# Patient Record
Sex: FEMALE | Race: WHITE | Employment: FULL TIME | URBAN - METROPOLITAN AREA
[De-identification: names, ages, dates, MRNs, and addresses within clinical notes are randomized per-mention and may not be internally consistent; named-entity substitution may affect disease eponyms.]

---

## 2017-07-26 ENCOUNTER — OFFICE VISIT (OUTPATIENT)
Dept: INTERNAL MEDICINE CLINIC | Age: 27
End: 2017-07-26

## 2017-07-26 VITALS
TEMPERATURE: 99.2 F | SYSTOLIC BLOOD PRESSURE: 106 MMHG | OXYGEN SATURATION: 95 % | DIASTOLIC BLOOD PRESSURE: 82 MMHG | HEART RATE: 97 BPM | BODY MASS INDEX: 26.18 KG/M2 | WEIGHT: 189 LBS

## 2017-07-26 DIAGNOSIS — J02.9 PHARYNGITIS, UNSPECIFIED ETIOLOGY: Primary | ICD-10-CM

## 2017-07-26 LAB — S PYO AG THROAT QL: NORMAL

## 2017-07-26 PROCEDURE — 87880 STREP A ASSAY W/OPTIC: CPT | Performed by: NURSE PRACTITIONER

## 2017-07-26 PROCEDURE — 99213 OFFICE O/P EST LOW 20 MIN: CPT | Performed by: NURSE PRACTITIONER

## 2017-07-26 RX ORDER — AMOXICILLIN AND CLAVULANATE POTASSIUM 875; 125 MG/1; MG/1
1 TABLET, FILM COATED ORAL EVERY 12 HOURS
Qty: 20 TABLET | Refills: 0 | Status: SHIPPED | OUTPATIENT
Start: 2017-07-26 | End: 2018-11-27 | Stop reason: SDUPTHER

## 2017-07-28 LAB — THROAT CULTURE: NORMAL

## 2017-08-04 ENCOUNTER — TELEPHONE (OUTPATIENT)
Dept: INTERNAL MEDICINE CLINIC | Age: 27
End: 2017-08-04

## 2017-08-04 RX ORDER — FLUCONAZOLE 150 MG/1
150 TABLET ORAL ONCE
Qty: 2 TABLET | Refills: 0 | Status: SHIPPED | OUTPATIENT
Start: 2017-08-04 | End: 2017-08-04

## 2018-01-12 ENCOUNTER — OFFICE VISIT (OUTPATIENT)
Dept: GYNECOLOGY | Age: 28
End: 2018-01-12

## 2018-01-12 VITALS
WEIGHT: 194 LBS | DIASTOLIC BLOOD PRESSURE: 62 MMHG | SYSTOLIC BLOOD PRESSURE: 113 MMHG | HEART RATE: 58 BPM | BODY MASS INDEX: 26.87 KG/M2

## 2018-01-12 DIAGNOSIS — Z76.89 ENCOUNTER TO ESTABLISH CARE: ICD-10-CM

## 2018-01-12 DIAGNOSIS — Z12.4 SCREENING FOR CERVICAL CANCER: ICD-10-CM

## 2018-01-12 DIAGNOSIS — N89.8 VAGINAL DISCHARGE: ICD-10-CM

## 2018-01-12 DIAGNOSIS — Z01.419 WOMEN'S ANNUAL ROUTINE GYNECOLOGICAL EXAMINATION: Primary | ICD-10-CM

## 2018-01-12 DIAGNOSIS — Z11.3 SCREENING FOR STDS (SEXUALLY TRANSMITTED DISEASES): ICD-10-CM

## 2018-01-12 LAB
BACTERIA WET PREP: NORMAL
CLUE CELLS: NORMAL
EPITHELIAL CELLS WET PREP: NORMAL
RBC WET PREP: NORMAL
SOURCE WET PREP: NORMAL
TRICHOMONAS PREP: NORMAL
WBC WET PREP: NORMAL
YEAST WET PREP: NORMAL

## 2018-01-12 PROCEDURE — 99385 PREV VISIT NEW AGE 18-39: CPT | Performed by: NURSE PRACTITIONER

## 2018-01-12 NOTE — PROGRESS NOTES
[facility]-Gynecology  Annual GYN Visit  Llewellyn Galeazzi, CNP    Zina Katz  YOB: 1990    Date of Service:  1/12/2018    Vitals:    01/12/18 0903   BP: 113/62   Pulse: 58   Weight: 194 lb (88 kg)     Body mass index is 26.87 kg/m². Wt Readings from Last 3 Encounters:   01/12/18 194 lb (88 kg)   12/20/17 190 lb (86.2 kg)   07/26/17 189 lb (85.7 kg)     BP Readings from Last 3 Encounters:   01/12/18 113/62   12/20/17 114/70   07/26/17 106/82       Chief Complaint:   Zina Katz is a 33 y.o. female who presents as a new patient for routine annual gynecologic visit and complaints of vaginal discharge and odor. HPI:  Patient complains of vaginal discharge- yellow with odor for 1 month. She denies irregular menses, heavy menses, dysmenorrhea, dyspareunia, pelvic pain, vulvar/vaginal itching, vulvar/vaginal irritation/pain, urinary frequency, urinary urgency and urinary incontinence. OCP/HRT side effects: not applicable. Patient is currently sexually active; one partner; expresses some concerns for STDs. Patient's last menstrual period was 12/19/2017 (exact date). Allergies   Allergen Reactions    Bactrim [Sulfamethoxazole-Trimethoprim] Hives     No outpatient prescriptions have been marked as taking for the 1/12/18 encounter (Office Visit) with Stephanie Pichardo CNP. Review of Systems:  A comprehensive review of systems was negative except for what was noted in the HPI. Physical Exam:  Constitutional: She is oriented to person, place, and time. She appears well-developed and well-nourished. No distress. Neck: No mass and no thyromegaly present. Cardiovascular: Normal rate, regular rhythm and normal heart sounds. No murmur heard. Pulmonary/Chest: Effort and breath sounds normal.   Abdominal: Soft, non-tender. No distension or masses.     Genitourinary: normal external genitalia, vulva, vagina, cervix, uterus and adnexa, urethral meatus normal and bladder not palpable and Cervix:  Inflammation; No CMT. Breast exam:  breasts appear normal, no suspicious masses, no skin or nipple changes or axillary nodes. Neurological: She is alert and oriented to person, place, and time. Skin: Skin is warm and dry. No rash noted. No erythema. Psychiatric: She has a normal mood and affect. Her behavior is normal.     Preventive Care and Risk Factor Assessment:  Hx abnormal PAP: n/a; no previous PAP  Self-breast exams: no  Exercise: cardiovascular equipment 20 minutes/day- 4 days/week and strength training 45 minutes/day- 4 days/week  Social History   Substance Use Topics    Smoking status: Never Smoker    Smokeless tobacco: Never Used    Alcohol use 3.6 oz/week     6 Cans of beer per week      History   Sexual Activity    Sexual activity: No     Hx of STD: no     Health Maintenance   Topic Date Due    HIV screen  12/23/2006    DTaP/Tdap/Td vaccine (1 - Tdap) 12/23/2010    Cervical cancer screen  12/23/2012    Flu vaccine (1) 09/01/2017       Assessment/Plan:  1. Encounter to establish care  2. Women's annual routine gynecological examination  [place] was seen today as a new patient for an annual gynecological exam.    - Healthy diet  - Exercise  - Self breast exams    3. Screening for cervical cancer  Continue annual screenings    - PAP SMEAR    4. Vaginal discharge  Scant white, thin, non-adherent, nonmalodorous discharge noted on exam. Possible BV. Cultures pending; treatment deferred until resulted. - Wet prep, genital  - Culture, Genital    5. Screening for STDs (sexually transmitted diseases)  Patient desires STD screening. Declines screening for HIV, Hepatitis, and Syphilis. Advised to complete these screenings if DNA probe or trichomonas is positive.    - C.trachomatis N.gonorrhoeae DNA, Thin Prep       Return in about 1 year (around 1/12/2019), or if symptoms worsen or fail to improve.

## 2018-01-12 NOTE — PATIENT INSTRUCTIONS
Patient Education        Well Visit, Ages 25 to 48: Care Instructions  Your Care Instructions    Physical exams can help you stay healthy. Your doctor has checked your overall health and may have suggested ways to take good care of yourself. He or she also may have recommended tests. At home, you can help prevent illness with healthy eating, regular exercise, and other steps. Follow-up care is a key part of your treatment and safety. Be sure to make and go to all appointments, and call your doctor if you are having problems. It's also a good idea to know your test results and keep a list of the medicines you take. How can you care for yourself at home? · Reach and stay at a healthy weight. This will lower your risk for many problems, such as obesity, diabetes, heart disease, and high blood pressure. · Get at least 30 minutes of physical activity on most days of the week. Walking is a good choice. You also may want to do other activities, such as running, swimming, cycling, or playing tennis or team sports. Discuss any changes in your exercise program with your doctor. · Do not smoke or allow others to smoke around you. If you need help quitting, talk to your doctor about stop-smoking programs and medicines. These can increase your chances of quitting for good. · Talk to your doctor about whether you have any risk factors for sexually transmitted infections (STIs). Having one sex partner (who does not have STIs and does not have sex with anyone else) is a good way to avoid these infections. · Use birth control if you do not want to have children at this time. Talk with your doctor about the choices available and what might be best for you. · Protect your skin from too much sun. When you're outdoors from 10 a.m. to 4 p.m., stay in the shade or cover up with clothing and a hat with a wide brim. Wear sunglasses that block UV rays.  Even when it's cloudy, put broad-spectrum sunscreen (SPF 30 or higher) on any condoms. For men  · Tests for sexually transmitted infections (STIs). Ask whether you should have tests for STIs. You may be at risk if you have sex with more than one person, especially if you do not wear a condom. · Testicular cancer exam. Ask your doctor whether you should check your testicles regularly. · Prostate exam. Talk to your doctor about whether you should have a blood test (called a PSA test) for prostate cancer. Experts differ on whether and when men should have this test. Some experts suggest it if you are older than 39 and are -American or have a father or brother who got prostate cancer when he was younger than 72. When should you call for help? Watch closely for changes in your health, and be sure to contact your doctor if you have any problems or symptoms that concern you. Where can you learn more? Go to https://Syrenaicapesalvatoreeb.healthWorldrat. org and sign in to your [employer] account. Enter P072 in the Vettro box to learn more about \"Well Visit, Ages 25 to 48: Care Instructions. \"     If you do not have an account, please click on the \"Sign Up Now\" link. Current as of: May 12, 2017  Content Version: 11.5  © 8972-3348 Jinni. Care instructions adapted under license by [employer]. If you have questions about a medical condition or this instruction, always ask your healthcare professional. [address] any warranty or liability for your use of this information. Patient Education        Learning About Pap Tests  What is a Pap test?    The Pap test (also called a Pap smear) is a screening test for cancer of the cervix, which is the lower part of the uterus that opens into the vagina. The test can help your doctor find early changes in the cells that could lead to cancer. During the test, the doctor or nurse will insert a tool called a speculum into your vagina. The speculum gently spreads apart the vaginal walls.  That allows your doctor to see inside the vagina and the cervix. He or she uses a cotton swab or brush to collect cell samples from your cervix. Try to schedule the test when you're not having your period. To get ready for a Pap test, avoid douches, tampons, vaginal medicines, sprays, or powders for at least a day before you have the test.  When should you have a Pap test?  Women should start having Pap tests at age 24. If you are younger than 24 and are sexually active, it's still a good idea to have regular testing for sexually transmitted infections. · Women 21 to 30 should have Pap tests every 3 years. · Women 30 to 72 may continue to have a Pap test every 3 years as long as their results are normal. Or they can choose to have a Pap test and an HPV test. This is called co-testing. With co-testing, women can have screening every 5 years as long as their test results are normal.  · Women 72 and older may no longer need Pap tests. When to stop having Pap tests depends on your medical history, your overall health, and your risk of cervical cell changes or cervical cancer. Talk with your doctor about whether you should stop or continue to have Pap tests. He or she can help you decide. These recommendations do not apply to women who have had a serious abnormal Pap test result or who have certain health problems. Talk to your doctor about how often you should be tested. There is also a newer test called a primary HPV test. It is used in women ages 22 and older. And it is done every 3 years, as long as a woman's test results are normal. A woman who has this test doesn't need to have a Pap test.  Having the HPV vaccine does not change your need for screening tests. Women who have had the HPV vaccine should follow the same screening schedules as women who have not had the vaccine. What happens after the test?  The sample of cells taken during your test will be sent to a lab so that an expert can look at the cells.  It usually anything you notice to see if you need further testing. Where can you learn more? Go to https://chpepiceweb.healthMedina [email or web address] org and sign in to your [employer] account. Enter P148 in the [facility] box to learn more about \"Breast Self-Exam: Care Instructions. \"     If you do not have an account, please click on the \"Sign Up Now\" link. Current as of: May 12, 2017  Content Version: 11.5  © 9983-1162 Our[employer]ree. Care instructions adapted under license by [facility] ([facility]). If you have questions about a medical condition or this instruction, always ask your healthcare professional. [place] [address] any warranty or liability for your use of this information. Patient Education        Eating Healthy Foods: Care Instructions  Your Care Instructions    Eating healthy foods can help lower your risk for disease. Healthy food gives you energy and keeps your heart strong, your brain active, your muscles working, and your bones strong. A healthy diet includes a variety of foods from the basic food groups: grains, vegetables, fruits, milk and milk products, and meat and beans. Some people may eat more of their favorite foods from only one food group and, as a result, miss getting the nutrients they need. So, it is important to pay attention not only to what you eat but also to what you are missing from your diet. You can eat a healthy, balanced diet by making a few small changes. Follow-up care is a key part of your treatment and safety. Be sure to make and go to all appointments, and call your doctor if you are having problems. It's also a good idea to know your test results and keep a list of the medicines you take. How can you care for yourself at home? Look at what you eat  · Keep a food diary for a week or two and record everything you eat or drink. Track the number of servings you eat from each food group.   · For a balanced diet every day, eat a variety of:  ¨ 6 or more information. Patient Education        Learning About Physical Activity  What is physical activity? Physical activity is any kind of activity that gets your body moving. The types of physical activity that can help you get fit and stay healthy include:  · Aerobic or \"cardio\" activities that make your heart beat faster and make you breathe harder, such as brisk walking, riding a bike, or running. Aerobic activities strengthen your heart and lungs and build up your endurance. · Strength training activities that make your muscles work against, or \"resist,\" something, such as lifting weights or doing push-ups. These activities help tone and strengthen your muscles. · Stretches that allow you to move your joints and muscles through their full range of motion. Stretching helps you be more flexible and avoid injury. What are the benefits of physical activity? Being active is one of the best things you can do to get fit and stay healthy. It helps you to:  · Feel stronger and have more energy to do all the things you like to do. · Focus better at school or work and perform better in sports. · Feel, think, and sleep better. · Reach and stay at a healthy weight. · Lose fat and build lean muscle. · Lower your risk for serious health problems. · Keep your bones, muscles, and joints strong. Being fit lets you do more physical activity. And it lets you work out harder without as much effort. How can you make physical activity part of your life? Get at least 30 minutes of exercise on most days of the week. Walking is a good choice. You also may want to do other activities, such as running, swimming, cycling, or playing tennis or team sports. Pick activities that you like-ones that make your heart beat faster, your muscles stronger, and your muscles and joints more flexible. If you find more than one thing you like doing, do them all. You don't have to do the same thing every day.   Get your heart pumping every that a person may be able to spread an STI even if he or she does not have symptoms. You and your partner may want to get an HIV test. You should get tested again 6 months later. Where can you learn more? Go to https://veronika.healthFibrocell [email or web address] org and sign in to your [employer] account. Enter Y197 in the c-crowd box to learn more about \"Safer Sex: Care Instructions. \"     If you do not have an account, please click on the \"Sign Up Now\" link. Current as of: March 20, 2017  Content Version: 11.5  © 8778-8400 [employer]. Care instructions adapted under license by [facility] ([facility]). If you have questions about a medical condition or this instruction, always ask your healthcare professional. [address] any warranty or liability for your use of this information. Patient Education        Bacterial Vaginosis: Care Instructions  Your Care Instructions    Bacterial vaginosis is a type of vaginal infection. It is caused by excess growth of certain bacteria that are normally found in the vagina. Symptoms can include itching, swelling, pain when you urinate or have sex, and a gray or yellow discharge with a \"fishy\" odor. It is not considered an infection that is spread through sexual contact. Although symptoms can be annoying and uncomfortable, bacterial vaginosis does not usually cause other health problems. However, if you have it while you are pregnant, it can cause complications. While the infection may go away on its own, most doctors use antibiotics to treat it. You may have been prescribed pills or vaginal cream. With treatment, bacterial vaginosis usually clears up in 5 to 7 days. Follow-up care is a key part of your treatment and safety. Be sure to make and go to all appointments, and call your doctor if you are having problems. It's also a good idea to know your test results and keep a list of the medicines you take.   How can you care for yourself at home?  · Take your antibiotics as directed. Do not stop taking them just because you feel better. You need to take the full course of antibiotics. · Do not eat or drink anything that contains alcohol if you are taking metronidazole (Flagyl). · Keep using your medicine if you start your period. Use pads instead of tampons while using a vaginal cream or suppository. Tampons can absorb the medicine. · Wear loose cotton clothing. Do not wear nylon and other materials that hold body heat and moisture close to the skin. · Do not scratch. Relieve itching with a cold pack or a cool bath. · Do not wash your vaginal area more than once a day. Use plain water or a mild, unscented soap. Do not douche. When should you call for help? Watch closely for changes in your health, and be sure to contact your doctor if:  ? · You have unexpected vaginal bleeding. ? · You have a fever. ? · You have new or increased pain in your vagina or pelvis. ? · You are not getting better after 1 week. ? · Your symptoms return after you finish the course of your medicine. Where can you learn more? Go to https://5 examples.Casengo. org and sign in to your MerryMarry account. Enter D462 in the Aoi.Co box to learn more about \"Bacterial Vaginosis: Care Instructions. \"     If you do not have an account, please click on the \"Sign Up Now\" link. Current as of: October 13, 2016  Content Version: 11.5  © 3800-1093 [employer]ive[employer]. Care instructions adapted under license by [facility] ([facility]). If you have questions about a medical condition or this instruction, always ask your healthcare professional. [place] [address] any warranty or liability for your use of this information.

## 2018-01-14 LAB — GENITAL CULTURE, ROUTINE: NORMAL

## 2018-01-18 ENCOUNTER — PATIENT MESSAGE (OUTPATIENT)
Dept: GYNECOLOGY | Age: 28
End: 2018-01-18

## 2018-01-18 DIAGNOSIS — Z11.3 SCREENING FOR STDS (SEXUALLY TRANSMITTED DISEASES): Primary | ICD-10-CM

## 2018-01-18 LAB
C. TRACHOMATIS DNA,THIN PREP: NEGATIVE
N. GONORRHOEAE DNA, THIN PREP: NEGATIVE

## 2018-01-19 LAB
HIV AG/AB: NORMAL
HIV ANTIGEN: NORMAL
HIV-1 ANTIBODY: NORMAL
HIV-2 AB: NORMAL

## 2018-04-27 ENCOUNTER — OFFICE VISIT (OUTPATIENT)
Dept: INTERNAL MEDICINE CLINIC | Age: 28
End: 2018-04-27

## 2018-04-27 VITALS
BODY MASS INDEX: 26.31 KG/M2 | WEIGHT: 190 LBS | DIASTOLIC BLOOD PRESSURE: 60 MMHG | TEMPERATURE: 98.4 F | SYSTOLIC BLOOD PRESSURE: 116 MMHG

## 2018-04-27 DIAGNOSIS — L30.9 PERIORBITAL DERMATITIS: ICD-10-CM

## 2018-04-27 PROCEDURE — G8417 CALC BMI ABV UP PARAM F/U: HCPCS | Performed by: INTERNAL MEDICINE

## 2018-04-27 PROCEDURE — 99213 OFFICE O/P EST LOW 20 MIN: CPT | Performed by: INTERNAL MEDICINE

## 2018-04-27 PROCEDURE — 1036F TOBACCO NON-USER: CPT | Performed by: INTERNAL MEDICINE

## 2018-04-27 PROCEDURE — G8427 DOCREV CUR MEDS BY ELIG CLIN: HCPCS | Performed by: INTERNAL MEDICINE

## 2018-04-27 ASSESSMENT — PATIENT HEALTH QUESTIONNAIRE - PHQ9
SUM OF ALL RESPONSES TO PHQ9 QUESTIONS 1 & 2: 0
1. LITTLE INTEREST OR PLEASURE IN DOING THINGS: 0
SUM OF ALL RESPONSES TO PHQ QUESTIONS 1-9: 0
2. FEELING DOWN, DEPRESSED OR HOPELESS: 0

## 2018-09-20 ENCOUNTER — OFFICE VISIT (OUTPATIENT)
Dept: INTERNAL MEDICINE CLINIC | Age: 28
End: 2018-09-20

## 2018-09-20 VITALS
RESPIRATION RATE: 16 BRPM | SYSTOLIC BLOOD PRESSURE: 104 MMHG | HEART RATE: 64 BPM | WEIGHT: 193 LBS | BODY MASS INDEX: 26.73 KG/M2 | TEMPERATURE: 98.4 F | DIASTOLIC BLOOD PRESSURE: 56 MMHG

## 2018-09-20 DIAGNOSIS — J01.10 ACUTE NON-RECURRENT FRONTAL SINUSITIS: Primary | ICD-10-CM

## 2018-09-20 PROCEDURE — G8417 CALC BMI ABV UP PARAM F/U: HCPCS | Performed by: INTERNAL MEDICINE

## 2018-09-20 PROCEDURE — 99213 OFFICE O/P EST LOW 20 MIN: CPT | Performed by: INTERNAL MEDICINE

## 2018-09-20 PROCEDURE — G8427 DOCREV CUR MEDS BY ELIG CLIN: HCPCS | Performed by: INTERNAL MEDICINE

## 2018-09-20 PROCEDURE — 1036F TOBACCO NON-USER: CPT | Performed by: INTERNAL MEDICINE

## 2018-09-20 RX ORDER — AZITHROMYCIN 250 MG/1
TABLET, FILM COATED ORAL
Qty: 1 PACKET | Refills: 0 | Status: SHIPPED | OUTPATIENT
Start: 2018-09-20 | End: 2018-09-24

## 2018-09-20 NOTE — PATIENT INSTRUCTIONS
Patient Education        Sinusitis: Care Instructions  Your Care Instructions    Sinusitis is an infection of the lining of the sinus cavities in your head. Sinusitis often follows a cold. It causes pain and pressure in your head and face. In most cases, sinusitis gets better on its own in 1 to 2 weeks. But some mild symptoms may last for several weeks. Sometimes antibiotics are needed. Follow-up care is a key part of your treatment and safety. Be sure to make and go to all appointments, and call your doctor if you are having problems. It's also a good idea to know your test results and keep a list of the medicines you take. How can you care for yourself at home? · Take an over-the-counter pain medicine, such as acetaminophen (Tylenol), ibuprofen (Advil, Motrin), or naproxen (Aleve). Read and follow all instructions on the label. · If the doctor prescribed antibiotics, take them as directed. Do not stop taking them just because you feel better. You need to take the full course of antibiotics. · Be careful when taking over-the-counter cold or flu medicines and Tylenol at the same time. Many of these medicines have acetaminophen, which is Tylenol. Read the labels to make sure that you are not taking more than the recommended dose. Too much acetaminophen (Tylenol) can be harmful. · Breathe warm, moist air from a steamy shower, a hot bath, or a sink filled with hot water. Avoid cold, dry air. Using a humidifier in your home may help. Follow the directions for cleaning the machine. · Use saline (saltwater) nasal washes to help keep your nasal passages open and wash out mucus and bacteria. You can buy saline nose drops at a grocery store or drugstore. Or you can make your own at home by adding 1 teaspoon of salt and 1 teaspoon of baking soda to 2 cups of distilled water. If you make your own, fill a bulb syringe with the solution, insert the tip into your nostril, and squeeze gently. Marcia Cocoa your nose.   · Put a hot, wet towel or a warm gel pack on your face 3 or 4 times a day for 5 to 10 minutes each time. · Try a decongestant nasal spray like oxymetazoline (Afrin). Do not use it for more than 3 days in a row. Using it for more than 3 days can make your congestion worse. When should you call for help? Call your doctor now or seek immediate medical care if:    · You have new or worse swelling or redness in your face or around your eyes.     · You have a new or higher fever.    Watch closely for changes in your health, and be sure to contact your doctor if:    · You have new or worse facial pain.     · The mucus from your nose becomes thicker (like pus) or has new blood in it.     · You are not getting better as expected. Where can you learn more? Go to https://MojoPagespesalvatoreeb.Zeolife. org and sign in to your [employer] account. Enter K965 in the ZoeMob box to learn more about \"Sinusitis: Care Instructions. \"     If you do not have an account, please click on the \"Sign Up Now\" link. Current as of: May 12, 2017  Content Version: 11.7  © 3959-2177 [email or web address], ArmorText. Care instructions adapted under license by [facility] ([facility]). If you have questions about a medical condition or this instruction, always ask your healthcare professional. [address] any warranty or liability for your use of this information.

## 2018-09-20 NOTE — PROGRESS NOTES
Progress Note:    Clinton Katz    9/20/2018    Chief Complaint   Patient presents with    Cough     Productive cough of yellowish sputum for two weeks. Fatigue     (s)Nish Katz has had >14 days of cough  Progression: not resolving, cough is more productive  Quality: opaque yellow phlegm  Radiation: chest and ENT  Severity: moderate, it's bothersome  Timing: sudden onset  Moderation: had subjective FC, no CP/SOB, NVD. Been taking Mucinex Severe Cold at night, daytime mucinex generic. BP (!) 104/56 (Site: Right Upper Arm, Position: Sitting, Cuff Size: Large Adult)   Pulse 64 Comment: Regular  Temp 98.4 °F (36.9 °C) (Oral)   Resp 16   Wt 193 lb (87.5 kg)   LMP 09/04/2018   BMI 26.73 kg/m²   Body mass index is 26.73 kg/m². Gen: Patient appears well groomed, well appearing  HEAD: Atraumatic, normocephalic,   Eyes: PERRLA, EOMI   Neck: supple, no thyroid nodule appreciated, no JVD  Chest: Clear to auscultation CARON, unlabored breathing, normal expansion  Heart: Regular rate, regular rhythm, no murmur, no rub  Abdomen: Non-tender, non-distended, bowel sounds present x3  Extremities: no edema, distal pulses intact  Patient was alert and oriented to person, place and time  ENT: External auricles intact, canal clear left, canal clear right, TM bulge left, TM bulge right, nasal turbinates edematous appearing, post pharynx non-erythematous     Clinton Fitch was seen today for cough. Diagnoses and all orders for this visit:    Acute non-recurrent frontal sinusitis  Signs of impaction, right frontal pressure with subjective fever still after >14 days, Zpack, zyrtec, sudafed, tylenol prn, details written on D/C papers    Other orders  -     azithromycin (ZITHROMAX Z-SAMMY) 250 MG tablet; Take 2 tablets (500 mg) on Day 1, and then take 1 tablet (250 mg) on days 2 through 5. No current outpatient prescriptions on file prior to visit. No current facility-administered medications on file prior to visit. Past Medical History:   Diagnosis Date    Anxiety     During college    Depression     During college    GERD (gastroesophageal reflux disease)     Low back pain     Stress incontinence in female     Since childhood- associated with frequent UTIs       Past Surgical History:   Procedure Laterality Date    APPENDECTOMY  01/2012    TYMPANOSTOMY TUBE PLACEMENT Bilateral     Multiple sets throughout childhood       Social History   Substance Use Topics    Smoking status: Never Smoker    Smokeless tobacco: Never Used    Alcohol use 3.6 oz/week     6 Cans of beer per week       Family History   Problem Relation Age of Onset    Anxiety Disorder Mother     Colon Cancer Maternal Grandfather 80    Stroke Maternal Grandfather 80    Breast Cancer Paternal Grandmother 76    Elevated Lipids Paternal Grandfather     Hypertension Paternal Grandfather     Kidney Disease Paternal Grandfather     Diabetes Paternal Grandfather     Heart Disease Maternal Aunt 62        Cardiomyopathy- sudden death

## 2018-11-27 ENCOUNTER — E-VISIT (OUTPATIENT)
Dept: INTERNAL MEDICINE CLINIC | Age: 28
End: 2018-11-27
Payer: COMMERCIAL

## 2018-11-27 DIAGNOSIS — J01.90 ACUTE SINUSITIS, RECURRENCE NOT SPECIFIED, UNSPECIFIED LOCATION: Primary | ICD-10-CM

## 2018-11-27 PROCEDURE — 99444 PR PHYSICIAN ONLINE EVALUATION & MANAGEMENT SERVICE: CPT | Performed by: INTERNAL MEDICINE

## 2018-11-27 RX ORDER — AMOXICILLIN AND CLAVULANATE POTASSIUM 875; 125 MG/1; MG/1
1 TABLET, FILM COATED ORAL EVERY 12 HOURS
Qty: 20 TABLET | Refills: 0 | Status: SHIPPED | OUTPATIENT
Start: 2018-11-27 | End: 2018-12-07

## 2018-11-27 ASSESSMENT — LIFESTYLE VARIABLES: SMOKING_STATUS: NO

## 2018-12-21 ENCOUNTER — NURSE ONLY (OUTPATIENT)
Dept: INTERNAL MEDICINE CLINIC | Age: 28
End: 2018-12-21
Payer: COMMERCIAL

## 2018-12-21 DIAGNOSIS — Z23 NEEDS FLU SHOT: Primary | ICD-10-CM

## 2018-12-21 PROCEDURE — 90686 IIV4 VACC NO PRSV 0.5 ML IM: CPT | Performed by: INTERNAL MEDICINE

## 2018-12-21 PROCEDURE — 90471 IMMUNIZATION ADMIN: CPT | Performed by: INTERNAL MEDICINE

## 2018-12-21 NOTE — PROGRESS NOTES
Vaccine Information Sheet, \"Influenza - Inactivated\"  given to René Nloan Sterling, or parent/legal guardian of  René Nolan Sterling and verbalized understanding. Patient responses:    Have you ever had a reaction to a flu vaccine? No  Are you able to eat eggs without adverse effects? Yes  Do you have any current illness? No  Have you ever had [place] Syndrome? No    Flu vaccine given per order. Please see immunization tab.

## 2020-02-06 ENCOUNTER — OFFICE VISIT (OUTPATIENT)
Dept: INTERNAL MEDICINE CLINIC | Age: 30
End: 2020-02-06
Payer: COMMERCIAL

## 2020-02-06 VITALS
WEIGHT: 186 LBS | BODY MASS INDEX: 25.76 KG/M2 | SYSTOLIC BLOOD PRESSURE: 102 MMHG | DIASTOLIC BLOOD PRESSURE: 60 MMHG | OXYGEN SATURATION: 98 % | TEMPERATURE: 98.7 F | HEART RATE: 88 BPM

## 2020-02-06 LAB
INFLUENZA A ANTIGEN, POC: NORMAL
INFLUENZA B ANTIGEN, POC: NORMAL

## 2020-02-06 PROCEDURE — 1036F TOBACCO NON-USER: CPT | Performed by: INTERNAL MEDICINE

## 2020-02-06 PROCEDURE — 87804 INFLUENZA ASSAY W/OPTIC: CPT | Performed by: INTERNAL MEDICINE

## 2020-02-06 PROCEDURE — G8484 FLU IMMUNIZE NO ADMIN: HCPCS | Performed by: INTERNAL MEDICINE

## 2020-02-06 PROCEDURE — G8427 DOCREV CUR MEDS BY ELIG CLIN: HCPCS | Performed by: INTERNAL MEDICINE

## 2020-02-06 PROCEDURE — 99213 OFFICE O/P EST LOW 20 MIN: CPT | Performed by: INTERNAL MEDICINE

## 2020-02-06 PROCEDURE — G8419 CALC BMI OUT NRM PARAM NOF/U: HCPCS | Performed by: INTERNAL MEDICINE

## 2020-02-06 RX ORDER — ONDANSETRON 4 MG/1
4 TABLET, ORALLY DISINTEGRATING ORAL EVERY 8 HOURS PRN
Qty: 12 TABLET | Refills: 1 | Status: SHIPPED | OUTPATIENT
Start: 2020-02-06

## 2020-02-06 ASSESSMENT — PATIENT HEALTH QUESTIONNAIRE - PHQ9
SUM OF ALL RESPONSES TO PHQ QUESTIONS 1-9: 0
SUM OF ALL RESPONSES TO PHQ9 QUESTIONS 1 & 2: 0
SUM OF ALL RESPONSES TO PHQ QUESTIONS 1-9: 0
1. LITTLE INTEREST OR PLEASURE IN DOING THINGS: 0
2. FEELING DOWN, DEPRESSED OR HOPELESS: 0

## 2020-02-06 NOTE — PROGRESS NOTES
distress. Appearance: She is well-developed. HENT:      Mouth/Throat:      Pharynx: No oropharyngeal exudate or posterior oropharyngeal erythema. Eyes:      General: No scleral icterus. Neck:      Musculoskeletal: Neck supple. Abdominal:      General: Bowel sounds are normal. There is no distension. Palpations: Abdomen is soft. There is no mass. Tenderness: There is no abdominal tenderness. There is no guarding or rebound. Skin:     General: Skin is warm and dry. Findings: No erythema or rash. Comments: No jaundice   Neurological:      Mental Status: She is alert and oriented to person, place, and time.    Psychiatric:         Behavior: Behavior normal.       Results for POC orders placed in visit on 02/06/20   POCT Influenza A/B Antigen   Result Value Ref Range    Inflenza A Ag neg     Influenza B Ag neg

## 2020-09-10 ENCOUNTER — OFFICE VISIT (OUTPATIENT)
Dept: PRIMARY CARE CLINIC | Age: 30
End: 2020-09-10
Payer: COMMERCIAL

## 2020-09-10 ENCOUNTER — NURSE TRIAGE (OUTPATIENT)
Dept: OTHER | Facility: CLINIC | Age: 30
End: 2020-09-10

## 2020-09-10 PROCEDURE — 99211 OFF/OP EST MAY X REQ PHY/QHP: CPT | Performed by: NURSE PRACTITIONER

## 2020-09-10 NOTE — TELEPHONE ENCOUNTER
Reason for Disposition   [1] COVID-19 infection suspected by caller or triager AND [2] mild symptoms (cough, fever, or others) AND [2] no complications or SOB    Answer Assessment - Initial Assessment Questions  1. COVID-19 DIAGNOSIS: \"Who made your Coronavirus (COVID-19) diagnosis? \" \"Was it confirmed by a positive lab test?\" If not diagnosed by a HCP, ask \"Are there lots of cases (community spread) where you live? \" (See public health department website, if unsure)      unsure  2. ONSET: \"When did the COVID-19 symptoms start? \"       9/92020  3. WORST SYMPTOM: \"What is your worst symptom? \" (e.g., cough, fever, shortness of breath, muscle aches)   muscle aches  4. COUGH: \"Do you have a cough? \" If so, ask: \"How bad is the cough? \"       no  5. FEVER: \"Do you have a fever? \" If so, ask: \"What is your temperature, how was it measured, and when did it start? \"  no  6. RESPIRATORY STATUS: \"Describe your breathing? \" (e.g., shortness of breath, wheezing, unable to speak)    no  7. BETTER-SAME-WORSE: Emmalene Leader you getting better, staying the same or getting worse compared to yesterday? \"  If getting worse, ask, \"In what way? \"    some  8. HIGH RISK DISEASE: \"Do you have any chronic medical problems? \" (e.g., asthma, heart or lung disease, weak immune system, etc.)      no  9. PREGNANCY: \"Is there any chance you are pregnant? \" \"When was your last menstrual period? \"      8/20  10. OTHER SYMPTOMS: \"Do you have any other symptoms? \"  (e.g., chills, fatigue, headache, loss of smell or taste, muscle pain, sore throat)     Diarrhea, muscle aches, sore throat and fatique    Protocols used: CORONAVIRUS (COVID-19) DIAGNOSED OR SUSPECTED-ADULT-AH    Stated has [facility] PCP and directed to schedule COVID test thru the testing line

## 2020-09-10 NOTE — PROGRESS NOTES
Dania Katz received a viral test for COVID-19. They were educated on isolation and quarantine as appropriate. For any symptoms, they were directed to seek care from their PCP, given contact information to establish with a doctor, directed to an urgent care or the emergency room.

## 2020-09-12 LAB — SARS-COV-2, NAA: NOT DETECTED

## 2020-11-11 ENCOUNTER — NURSE ONLY (OUTPATIENT)
Dept: INTERNAL MEDICINE CLINIC | Age: 30
End: 2020-11-11
Payer: COMMERCIAL

## 2020-11-11 PROCEDURE — 90686 IIV4 VACC NO PRSV 0.5 ML IM: CPT | Performed by: INTERNAL MEDICINE

## 2020-11-11 PROCEDURE — 90471 IMMUNIZATION ADMIN: CPT | Performed by: INTERNAL MEDICINE

## 2020-11-23 ENCOUNTER — OFFICE VISIT (OUTPATIENT)
Dept: PRIMARY CARE CLINIC | Age: 30
End: 2020-11-23
Payer: COMMERCIAL

## 2020-11-23 PROCEDURE — 99211 OFF/OP EST MAY X REQ PHY/QHP: CPT | Performed by: NURSE PRACTITIONER

## 2020-11-23 NOTE — PROGRESS NOTES
Symonelinda Handler Sterling received a viral test for COVID-19. They were educated on isolation and quarantine as appropriate. For any symptoms, they were directed to seek care from their PCP, given contact information to establish with a doctor, directed to an urgent care or the emergency room.

## 2020-11-24 LAB — SARS-COV-2, NAA: NOT DETECTED

## 2020-12-02 ENCOUNTER — OFFICE VISIT (OUTPATIENT)
Dept: PRIMARY CARE CLINIC | Age: 30
End: 2020-12-02
Payer: COMMERCIAL

## 2020-12-02 PROCEDURE — 99211 OFF/OP EST MAY X REQ PHY/QHP: CPT | Performed by: NURSE PRACTITIONER

## 2020-12-02 NOTE — PROGRESS NOTES
Vaishnavi Katz received a viral test for COVID-19. They were educated on isolation and quarantine as appropriate. For any symptoms, they were directed to seek care from their PCP, given contact information to establish with a doctor, directed to an urgent care or the emergency room.

## 2020-12-07 LAB — SARS-COV-2, NAA: NOT DETECTED

## 2021-03-05 ENCOUNTER — OFFICE VISIT (OUTPATIENT)
Dept: PRIMARY CARE CLINIC | Age: 31
End: 2021-03-05
Payer: COMMERCIAL

## 2021-03-05 DIAGNOSIS — Z20.822 SUSPECTED COVID-19 VIRUS INFECTION: Primary | ICD-10-CM

## 2021-03-05 LAB — SARS-COV-2: DETECTED

## 2021-03-05 PROCEDURE — 99211 OFF/OP EST MAY X REQ PHY/QHP: CPT | Performed by: NURSE PRACTITIONER

## 2021-03-05 NOTE — PROGRESS NOTES
Sammie Katz received a viral test for COVID-19. They were educated on isolation and quarantine as appropriate. For any symptoms, they were directed to seek care from their PCP, given contact information to establish with a doctor, directed to an urgent care or the emergency room.

## 2021-04-15 ENCOUNTER — OFFICE VISIT (OUTPATIENT)
Dept: GYNECOLOGY | Age: 31
End: 2021-04-15
Payer: COMMERCIAL

## 2021-04-15 VITALS
RESPIRATION RATE: 17 BRPM | BODY MASS INDEX: 25.06 KG/M2 | HEART RATE: 89 BPM | HEIGHT: 71 IN | TEMPERATURE: 97.2 F | WEIGHT: 179 LBS | SYSTOLIC BLOOD PRESSURE: 125 MMHG | DIASTOLIC BLOOD PRESSURE: 57 MMHG

## 2021-04-15 DIAGNOSIS — Z01.419 WELL WOMAN EXAM WITH ROUTINE GYNECOLOGICAL EXAM: Primary | ICD-10-CM

## 2021-04-15 PROCEDURE — 99385 PREV VISIT NEW AGE 18-39: CPT | Performed by: OBSTETRICS & GYNECOLOGY

## 2021-04-17 ASSESSMENT — ENCOUNTER SYMPTOMS
EYES NEGATIVE: 1
GASTROINTESTINAL NEGATIVE: 1
RESPIRATORY NEGATIVE: 1

## 2021-04-18 NOTE — PROGRESS NOTES
Subjective:      Patient ID: Jacqueline Katz is a 35 y.o. female. Patient is here for annual. Patient no issues. Gynecologic Exam        Review of Systems   Constitutional: Negative. HENT: Negative. Eyes: Negative. Respiratory: Negative. Cardiovascular: Negative. Gastrointestinal: Negative. Genitourinary: Negative. Musculoskeletal: Negative. Skin: Negative. Neurological: Negative. Psychiatric/Behavioral: Negative. Date of Birth 1990  Past Medical History:   Diagnosis Date    Anxiety     During college    Depression     During college    GERD (gastroesophageal reflux disease)     Low back pain     Stress incontinence in female     Since childhood- associated with frequent UTIs     Past Surgical History:   Procedure Laterality Date    APPENDECTOMY  2012    TYMPANOSTOMY TUBE PLACEMENT Bilateral     Multiple sets throughout childhood     OB History    Para Term  AB Living   0 0 0 0 0 0   SAB TAB Ectopic Molar Multiple Live Births   0 0 0 0 0 0     Social History     Socioeconomic History    Marital status: Single     Spouse name: Not on file    Number of children: Not on file    Years of education: Not on file    Highest education level: Not on file   Occupational History    Not on file   Social Needs    Financial resource strain: Not on file    Food insecurity     Worry: Not on file     Inability: Not on file    Transportation needs     Medical: Not on file     Non-medical: Not on file   Tobacco Use    Smoking status: Never Smoker    Smokeless tobacco: Never Used   Substance and Sexual Activity    Alcohol use:  Yes     Alcohol/week: 6.0 standard drinks     Types: 6 Cans of beer per week    Drug use: No    Sexual activity: Yes     Partners: Male     Birth control/protection: Condom   Lifestyle    Physical activity     Days per week: Not on file     Minutes per session: Not on file    Stress: Not on file   Relationships    Social connections     Talks on phone: Not on file     Gets together: Not on file     Attends [employer] service: Not on file     Active member of club or organization: Not on file     Attends meetings of clubs or organizations: Not on file     Relationship status: Not on file    Intimate partner violence     Fear of current or ex partner: Not on file     Emotionally abused: Not on file     Physically abused: Not on file     Forced sexual activity: Not on file   Other Topics Concern    Not on file   Social History Narrative    Not on file     Allergies   Allergen Reactions    Bactrim [Sulfamethoxazole-Trimethoprim] Hives     No outpatient medications have been marked as taking for the 4/15/21 encounter (Office Visit) with Deb Kate MD.     Family History   Problem Relation Age of Onset    Anxiety Disorder Mother     Colon Cancer Maternal Grandfather 80    Stroke Maternal Grandfather 80    Breast Cancer Paternal Grandmother 76    Elevated Lipids Paternal Grandfather     Hypertension Paternal Grandfather     Kidney Disease Paternal Grandfather     Diabetes Paternal Grandfather     Heart Disease Maternal Aunt 62        Cardiomyopathy- sudden death     BP (!) 125/57 (Site: Right Upper Arm, Position: Sitting, Cuff Size: Large Adult)   Pulse 89   Temp 97.2 °F (36.2 °C)   Resp 17   Ht 5' 11\" (1.803 m)   Wt 179 lb (81.2 kg)   LMP 03/23/2021   BMI 24.97 kg/m²       Objective:   Physical Exam  Constitutional:       Appearance: Normal appearance. She is well-developed and normal weight. HENT:      Head: Normocephalic. Nose: Nose normal.      Mouth/Throat:      Mouth: Mucous membranes are moist.      Pharynx: Oropharynx is clear. Eyes:      Pupils: Pupils are equal, round, and reactive to light. Neck:      Musculoskeletal: Normal range of motion and neck supple. No neck rigidity. Thyroid: No thyromegaly. Cardiovascular:      Rate and Rhythm: Normal rate and regular rhythm.       Pulses: Normal pulses. Heart sounds: Normal heart sounds. No murmur. No friction rub. No gallop. Pulmonary:      Effort: Pulmonary effort is normal. No respiratory distress. Breath sounds: Normal breath sounds. No stridor. No wheezing, rhonchi or rales. Chest:      Chest wall: No tenderness. Breasts:         Right: Normal. No swelling, bleeding, inverted nipple, mass, nipple discharge, skin change or tenderness. Left: Normal. No swelling, bleeding, inverted nipple, mass, nipple discharge, skin change or tenderness. Abdominal:      General: Bowel sounds are normal. There is no distension. Palpations: Abdomen is soft. There is no mass. Tenderness: There is no abdominal tenderness. There is no guarding or rebound. Hernia: No hernia is present. There is no hernia in the left inguinal area. Genitourinary:     General: Normal vulva. Exam position: Lithotomy position. Pubic Area: No rash. Labia:         Right: No rash, tenderness, lesion or injury. Left: No rash, tenderness, lesion or injury. Urethra: No prolapse, urethral pain, urethral swelling or urethral lesion. Vagina: No signs of injury and foreign body. No vaginal discharge, erythema, tenderness, bleeding, lesions or prolapsed vaginal walls. Cervix: No cervical motion tenderness, discharge, friability, lesion, erythema, cervical bleeding or eversion. Uterus: Not deviated, not enlarged, not fixed and not tender. Adnexa:         Right: No mass, tenderness or fullness. Left: No mass, tenderness or fullness. Rectum: No anal fissure or external hemorrhoid. Comments: Normal urethral meatus, normal urethra, nl bladder  Musculoskeletal: Normal range of motion. General: No tenderness. Lymphadenopathy:      Cervical: No cervical adenopathy. Lower Body: No right inguinal adenopathy. No left inguinal adenopathy. Skin:     General: Skin is warm and dry. Coloration: Skin is not pale. Findings: No erythema or rash. Neurological:      General: No focal deficit present. Mental Status: She is alert and oriented to person, place, and time. Mental status is at baseline. Deep Tendon Reflexes: Reflexes are normal and symmetric. Psychiatric:         Mood and Affect: Mood normal.         Behavior: Behavior normal.         Thought Content: Thought content normal.         Judgment: Judgment normal.         Assessment:      1. annual      Plan:      1.  Pap, calcium, exercise        Mehran Mcmanus MD

## 2021-06-23 RX ORDER — ETONOGESTREL AND ETHINYL ESTRADIOL 11.7; 2.7 MG/1; MG/1
1 INSERT, EXTENDED RELEASE VAGINAL
Qty: 3 EACH | Refills: 3 | Status: SHIPPED | OUTPATIENT
Start: 2021-06-23 | End: 2021-07-23 | Stop reason: SDUPTHER

## 2021-07-23 ENCOUNTER — TELEPHONE (OUTPATIENT)
Dept: GYNECOLOGY | Age: 31
End: 2021-07-23

## 2021-07-23 RX ORDER — ETONOGESTREL AND ETHINYL ESTRADIOL 11.7; 2.7 MG/1; MG/1
1 INSERT, EXTENDED RELEASE VAGINAL
Qty: 3 EACH | Refills: 3 | Status: SHIPPED | OUTPATIENT
Start: 2021-07-23 | End: 2021-08-13 | Stop reason: SDUPTHER

## 2021-07-23 NOTE — TELEPHONE ENCOUNTER
Patient is to start her Nuvaring Sunday after next period. Instructions are insert for 21 days, remove for 7 days, then repeat. I sent order over again.

## 2021-07-23 NOTE — TELEPHONE ENCOUNTER
Patient called into office stating that she was told that she needed to start her Nuvaring the Sunday after her menstrual cycle. Patient stated she is going out of town today her flight leaves at 4 pm. And she will be gone for today. Patient can be contacted at [phone]    Louise wants to know what time will she hear back in regaridng to this. Advised patient that Dr Kayla Ruggiero was not in the office on today she is in surgery on today. And she will check her messages in between and will follow up.  linda

## 2021-07-23 NOTE — TELEPHONE ENCOUNTER
[employer] [place] is requesting the directions for the following please be verified:    etonogestrel-ethinyl estradiol (NUVARING) 0.12-0.015 MG/24HR vaginal ring Place 1 each vaginally every 21 days Insert one (1) ring vaginally and leave in place for three (3) weeks, then remove for one (1) week     Please contact Elliott Espino @ phone # provided.

## 2021-07-30 ENCOUNTER — TELEPHONE (OUTPATIENT)
Dept: INTERNAL MEDICINE CLINIC | Age: 31
End: 2021-07-30

## 2021-07-30 NOTE — TELEPHONE ENCOUNTER
Patient has been having Migraines since wednesday she went to the [facility] they gave her IV Fluids and some benadryl. She would to know if she could be seen sooner than Dr Benito Hinojosa next available. Please call her at the number provided.

## 2021-08-02 ENCOUNTER — VIRTUAL VISIT (OUTPATIENT)
Dept: INTERNAL MEDICINE CLINIC | Age: 31
End: 2021-08-02
Payer: COMMERCIAL

## 2021-08-02 DIAGNOSIS — R20.0 NUMBNESS OF LIP: Primary | ICD-10-CM

## 2021-08-02 DIAGNOSIS — G43.119 INTRACTABLE MIGRAINE WITH AURA WITHOUT STATUS MIGRAINOSUS: ICD-10-CM

## 2021-08-02 PROCEDURE — 99213 OFFICE O/P EST LOW 20 MIN: CPT | Performed by: INTERNAL MEDICINE

## 2021-08-02 RX ORDER — SUMATRIPTAN 50 MG/1
50 TABLET, FILM COATED ORAL
Qty: 9 TABLET | Refills: 3 | Status: SHIPPED | OUTPATIENT
Start: 2021-08-02 | End: 2021-11-05

## 2021-08-02 NOTE — Clinical Note
Please call patient to schedule a 3 month follow up with Dr. Farnaz Villasenor. Please also give her the central scheduling number. Thank you!

## 2021-08-13 RX ORDER — ETONOGESTREL AND ETHINYL ESTRADIOL 11.7; 2.7 MG/1; MG/1
1 INSERT, EXTENDED RELEASE VAGINAL
Qty: 3 EACH | Refills: 3 | Status: SHIPPED | OUTPATIENT
Start: 2021-08-13 | End: 2021-11-05

## 2021-08-31 ENCOUNTER — VIRTUAL VISIT (OUTPATIENT)
Dept: INTERNAL MEDICINE CLINIC | Age: 31
End: 2021-08-31
Payer: COMMERCIAL

## 2021-08-31 DIAGNOSIS — J02.9 PHARYNGITIS, UNSPECIFIED ETIOLOGY: Primary | ICD-10-CM

## 2021-08-31 PROCEDURE — 99213 OFFICE O/P EST LOW 20 MIN: CPT | Performed by: INTERNAL MEDICINE

## 2021-08-31 RX ORDER — AMOXICILLIN 875 MG/1
875 TABLET, COATED ORAL 2 TIMES DAILY
Qty: 20 TABLET | Refills: 0 | Status: SHIPPED | OUTPATIENT
Start: 2021-08-31 | End: 2021-09-10

## 2021-08-31 SDOH — ECONOMIC STABILITY: TRANSPORTATION INSECURITY
IN THE PAST 12 MONTHS, HAS LACK OF TRANSPORTATION KEPT YOU FROM MEETINGS, WORK, OR FROM GETTING THINGS NEEDED FOR DAILY LIVING?: NO

## 2021-08-31 SDOH — ECONOMIC STABILITY: INCOME INSECURITY: IN THE LAST 12 MONTHS, WAS THERE A TIME WHEN YOU WERE NOT ABLE TO PAY THE MORTGAGE OR RENT ON TIME?: NO

## 2021-08-31 SDOH — ECONOMIC STABILITY: HOUSING INSECURITY
IN THE LAST 12 MONTHS, WAS THERE A TIME WHEN YOU DID NOT HAVE A STEADY PLACE TO SLEEP OR SLEPT IN A SHELTER (INCLUDING NOW)?: NO

## 2021-08-31 SDOH — ECONOMIC STABILITY: TRANSPORTATION INSECURITY
IN THE PAST 12 MONTHS, HAS THE LACK OF TRANSPORTATION KEPT YOU FROM MEDICAL APPOINTMENTS OR FROM GETTING MEDICATIONS?: NO

## 2021-08-31 SDOH — ECONOMIC STABILITY: FOOD INSECURITY: WITHIN THE PAST 12 MONTHS, YOU WORRIED THAT YOUR FOOD WOULD RUN OUT BEFORE YOU GOT MONEY TO BUY MORE.: NEVER TRUE

## 2021-08-31 SDOH — ECONOMIC STABILITY: FOOD INSECURITY: WITHIN THE PAST 12 MONTHS, THE FOOD YOU BOUGHT JUST DIDN'T LAST AND YOU DIDN'T HAVE MONEY TO GET MORE.: NEVER TRUE

## 2021-08-31 ASSESSMENT — PATIENT HEALTH QUESTIONNAIRE - PHQ9
1. LITTLE INTEREST OR PLEASURE IN DOING THINGS: 0
SUM OF ALL RESPONSES TO PHQ QUESTIONS 1-9: 0
SUM OF ALL RESPONSES TO PHQ9 QUESTIONS 1 & 2: 0
SUM OF ALL RESPONSES TO PHQ QUESTIONS 1-9: 0
2. FEELING DOWN, DEPRESSED OR HOPELESS: 0
SUM OF ALL RESPONSES TO PHQ QUESTIONS 1-9: 0

## 2021-08-31 ASSESSMENT — SOCIAL DETERMINANTS OF HEALTH (SDOH): HOW HARD IS IT FOR YOU TO PAY FOR THE VERY BASICS LIKE FOOD, HOUSING, MEDICAL CARE, AND HEATING?: NOT HARD AT ALL

## 2021-11-03 PROBLEM — G43.109 MIGRAINE WITH AURA: Status: ACTIVE | Noted: 2021-11-03

## 2021-11-03 PROBLEM — L30.9 PERIORBITAL DERMATITIS: Status: RESOLVED | Noted: 2018-04-27 | Resolved: 2021-11-03

## 2021-11-03 NOTE — PROGRESS NOTES
department/urgent care visits for headache since last visit: 7/21. Recent diagnostic testing: did not complete head CT ordered 8/21. ROS  As documented above    Physical Exam  Constitutional:       General: She is not in acute distress. Appearance: She is well-developed. HENT:      Head: Normocephalic and atraumatic. Right Ear: Tympanic membrane, ear canal and external ear normal.      Left Ear: Tympanic membrane, ear canal and external ear normal.      Mouth/Throat:      Pharynx: No oropharyngeal exudate or posterior oropharyngeal erythema. Eyes:      General: Lids are normal.      Conjunctiva/sclera: Conjunctivae normal.   Musculoskeletal:      Cervical back: Normal range of motion and neck supple. No spinous process tenderness or muscular tenderness. Lymphadenopathy:      Cervical: No cervical adenopathy. Skin:     General: Skin is warm and dry. Findings: No erythema or rash. Neurological:      Mental Status: She is alert and oriented to person, place, and time. Cranial Nerves: No cranial nerve deficit. Sensory: No sensory deficit. Coordination: Coordination normal.      Gait: Gait normal.      Deep Tendon Reflexes: Reflexes are normal and symmetric. Psychiatric:         Behavior: Behavior normal.         Thought Content: Thought content normal.         Judgment: Judgment normal.       On this date 11/5/2021 I have spent 30 minutes reviewing previous notes, test results and face to face with the patient discussing the diagnosis and importance of compliance with the treatment plan as well as documenting on the day of the visit. --Lazara Mclaughlin MD on 11/7/2021 at 11:10 AM    An electronic signature was used to authenticate this note.

## 2021-11-05 ENCOUNTER — OFFICE VISIT (OUTPATIENT)
Dept: INTERNAL MEDICINE CLINIC | Age: 31
End: 2021-11-05
Payer: COMMERCIAL

## 2021-11-05 VITALS
HEART RATE: 94 BPM | WEIGHT: 180 LBS | DIASTOLIC BLOOD PRESSURE: 78 MMHG | RESPIRATION RATE: 16 BRPM | BODY MASS INDEX: 24.38 KG/M2 | HEIGHT: 72 IN | OXYGEN SATURATION: 98 % | SYSTOLIC BLOOD PRESSURE: 128 MMHG

## 2021-11-05 DIAGNOSIS — G43.109 MIGRAINE WITH AURA AND WITHOUT STATUS MIGRAINOSUS, NOT INTRACTABLE: Primary | ICD-10-CM

## 2021-11-05 PROCEDURE — 99214 OFFICE O/P EST MOD 30 MIN: CPT | Performed by: INTERNAL MEDICINE

## 2021-11-05 PROCEDURE — 90674 CCIIV4 VAC NO PRSV 0.5 ML IM: CPT | Performed by: INTERNAL MEDICINE

## 2021-11-05 PROCEDURE — 90471 IMMUNIZATION ADMIN: CPT | Performed by: INTERNAL MEDICINE

## 2021-11-05 RX ORDER — PROMETHAZINE HYDROCHLORIDE 25 MG/1
25 TABLET ORAL EVERY 6 HOURS PRN
Qty: 15 TABLET | Refills: 1 | Status: SHIPPED | OUTPATIENT
Start: 2021-11-05 | End: 2021-12-23 | Stop reason: SDUPTHER

## 2021-11-05 NOTE — PATIENT INSTRUCTIONS
Riboflavin 200 mg 2x/day  Magnesium oxide 400 mg per day    [facility] Scheduling  [phone]

## 2021-11-07 NOTE — ASSESSMENT & PLAN NOTE
Improved. Cause for exacerbation over the summer unclear- she was advised to keep a headache diary to help identify triggers. Imitrex 50 mg and Zofran only partially effective for acute episodes, so she will try 100 mg Imitrex and phenergan added for rescue. Discussed conservative preventive strategies, including riboflavin and magnesium supplementation. If episodes become more frequent or severe, she will schedule head CT (declined MRI due to claustrophobia) and will consider more aggressive preventive strategies, such as Topamax, Elavil or beta blocker. Benign Paroxysmal Positional Vertigo    WHAT YOU NEED TO KNOW:    BPPV is an inner ear condition that causes you to suddenly feel dizzy. Benign means it is not serious or life-threatening. BPPV is caused by a problem with the nerves and structure of your inner ear. BPPV happens when small pieces of calcium break loose and lump together in one of your inner ear canals.     Ear Anatomy         DISCHARGE INSTRUCTIONS:    Seek care immediately if:   •You fall during a BPPV episode and are injured.      •You have a severe headache that does not go away.      •You have new changes in your vision or feel weak or confused.      •You have problems hearing, or you have ringing or buzzing in your ears.      Contact your healthcare provider if:   •Your BPPV symptoms do not go away or they return.      •You have problems with your balance, or you are falling often.      •You have new or increased nausea or vomiting with vertigo.      •You feel anxious or depressed and do not want to leave your home.      •You have questions or concerns about your condition or care.      Medicines:   •Medicines may be recommended or prescribed to treat dizziness or nausea.      •Take your medicine as directed. Contact your healthcare provider if you think your medicine is not helping or if you have side effects. Tell him of her if you are allergic to any medicine. Keep a list of the medicines, vitamins, and herbs you take. Include the amounts, and when and why you take them. Bring the list or the pill bottles to follow-up visits. Carry your medicine list with you in case of an emergency.      Prevent your symptoms:   •Try to avoid sudden head movements. Stand up and lie down slowly.       •Raise and support your head when you lie down. Place pillows under your upper back and head or rest in a recliner.       •Change your position often when you are lying down. Try not to lie with your head on the same side for long periods of time. Roll over slowly.       •Wear protective gear when you ride a bike or play sports. A helmet helps protect your head from injury.      Follow up with your healthcare provider as directed: You may need to return in 1 month to check the progress of your treatment. Write down your questions so you remember to ask them during your visits.

## 2021-12-15 DIAGNOSIS — Z11.3 ROUTINE SCREENING FOR STI (SEXUALLY TRANSMITTED INFECTION): Primary | ICD-10-CM

## 2021-12-16 DIAGNOSIS — Z11.3 ROUTINE SCREENING FOR STI (SEXUALLY TRANSMITTED INFECTION): ICD-10-CM

## 2021-12-16 LAB
HBV SURFACE AB TITR SER: <3.5 MIU/ML
HEPATITIS C ANTIBODY INTERPRETATION: NORMAL

## 2021-12-17 LAB
C. TRACHOMATIS DNA ,URINE: NEGATIVE
HIV AG/AB: NORMAL
HIV ANTIGEN: NORMAL
HIV-1 ANTIBODY: NORMAL
HIV-2 AB: NORMAL
N. GONORRHOEAE DNA, URINE: NEGATIVE
TOTAL SYPHILLIS IGG/IGM: NORMAL

## 2021-12-23 ENCOUNTER — TELEPHONE (OUTPATIENT)
Dept: INTERNAL MEDICINE CLINIC | Age: 31
End: 2021-12-23

## 2021-12-23 RX ORDER — PROMETHAZINE HYDROCHLORIDE 25 MG/1
25 TABLET ORAL EVERY 6 HOURS PRN
Qty: 15 TABLET | Refills: 1 | Status: SHIPPED | OUTPATIENT
Start: 2021-12-23

## 2021-12-23 NOTE — TELEPHONE ENCOUNTER
Patient is needing a re-fill on her Phenergan 25 mg. Please send into [facility] on [address] in [facility] this time. Last appt:  11/5. Next appt:  5/10/22. Last re-fill 11/5/2021. Went to her regular pharmacy earlier today to  but they had no re-fill there. She does not want to wait in that long line again so she is requesting we send to [facility] on [address] in [facility] this time.

## 2022-02-01 ENCOUNTER — PATIENT MESSAGE (OUTPATIENT)
Dept: INTERNAL MEDICINE CLINIC | Age: 32
End: 2022-02-01

## 2022-02-01 ENCOUNTER — E-VISIT (OUTPATIENT)
Dept: INTERNAL MEDICINE CLINIC | Age: 32
End: 2022-02-01
Payer: COMMERCIAL

## 2022-02-01 DIAGNOSIS — J01.90 ACUTE NON-RECURRENT SINUSITIS, UNSPECIFIED LOCATION: Primary | ICD-10-CM

## 2022-02-01 PROCEDURE — 99421 OL DIG E/M SVC 5-10 MIN: CPT | Performed by: INTERNAL MEDICINE

## 2022-02-01 RX ORDER — AMOXICILLIN AND CLAVULANATE POTASSIUM 875; 125 MG/1; MG/1
1 TABLET, FILM COATED ORAL 2 TIMES DAILY
Qty: 20 TABLET | Refills: 0 | Status: SHIPPED | OUTPATIENT
Start: 2022-02-01 | End: 2022-02-11

## 2022-02-01 ASSESSMENT — LIFESTYLE VARIABLES: SMOKING_STATUS: NO, I'VE NEVER SMOKED

## 2022-02-01 NOTE — TELEPHONE ENCOUNTER
From: Tuan Katz  To: Dr. Candie Lemus  Sent: 2/1/2022 1:47 PM EST  Subject: Possible Sinus Infection    Dr. Yola Liu,    I started feeling sick last Wednesday evening with a fever and other head cold symptoms. I took an at-home covid test both Thursday and this morning and they were both negative. With these 2 negative tests and the dark yellow color of my mucus, I believe I have some sort of bacterial infection. Would it be possible or necessary to start an antibiotic?     Thank you,  Tuan Katz

## 2022-02-01 NOTE — PROGRESS NOTES
HPI: as per patient provided history  Exam: N/A (electronic visit)  ASSESSMENT/PLAN:  1. Acute non-recurrent sinusitis, unspecified location  Likely post-viral.  Supportive care guidelines provided. - amoxicillin-clavulanate (AUGMENTIN) 875-125 MG per tablet; Take 1 tablet by mouth 2 times daily for 10 days Take with meals. Dispense: 20 tablet; Refill: 0    Patient instructed to call the office if worsens, or fails to improve as anticipated. 5-10 minutes were spent on the digital evaluation and management of this patient.

## 2022-02-18 RX ORDER — DROSPIRENONE AND ETHINYL ESTRADIOL 0.02-3(28)
1 KIT ORAL DAILY
Qty: 84 TABLET | Refills: 1 | Status: SHIPPED | OUTPATIENT
Start: 2022-02-18 | End: 2022-05-17 | Stop reason: SDUPTHER

## 2022-03-10 ENCOUNTER — E-VISIT (OUTPATIENT)
Dept: GYNECOLOGY | Age: 32
End: 2022-03-10
Payer: COMMERCIAL

## 2022-03-10 DIAGNOSIS — R30.0 DYSURIA: Primary | ICD-10-CM

## 2022-03-10 PROCEDURE — 99421 OL DIG E/M SVC 5-10 MIN: CPT | Performed by: OBSTETRICS & GYNECOLOGY

## 2022-03-10 RX ORDER — FLUCONAZOLE 150 MG/1
150 TABLET ORAL ONCE
Qty: 1 TABLET | Refills: 1 | Status: SHIPPED | OUTPATIENT
Start: 2022-03-10 | End: 2022-03-10

## 2022-03-10 RX ORDER — CIPROFLOXACIN 500 MG/1
500 TABLET, FILM COATED ORAL 2 TIMES DAILY
Qty: 14 TABLET | Refills: 0 | Status: SHIPPED | OUTPATIENT
Start: 2022-03-10 | End: 2022-03-17

## 2022-03-10 NOTE — PROGRESS NOTES
Patient concerned about urinary tract infection. Patient with some urinary pressure. Patient with urinary frequency. Does have a new partner. Review of Systems: as above  General ROS: negative  Psychological ROS: negative  Ophthalmic ROS: negative  ENT ROS: negative  Allergy and Immunology ROS: negative  Hematological and Lymphatic ROS: negative  Endocrine ROS: negative  Breast ROS: negative  Respiratory ROS: negative  Cardiovascular ROS: negative  Gastrointestinal ROS: negative  Genito-Urinary ROS: as above  Musculoskeletal ROS: negative  Neurological ROS: negative  Dermatological ROS: negative    Date of Birth 1990  Past Medical History:   Diagnosis Date    Anxiety     During college    Depression     During college    GERD (gastroesophageal reflux disease)     Low back pain     Migraine with aura 11/3/2021    Stress incontinence in female     Since childhood- associated with frequent UTIs     Past Surgical History:   Procedure Laterality Date    APPENDECTOMY  2012    TYMPANOSTOMY TUBE PLACEMENT Bilateral     Multiple sets throughout childhood     OB History    Para Term  AB Living   0 0 0 0 0 0   SAB IAB Ectopic Molar Multiple Live Births   0 0 0 0 0 0     Social History     Socioeconomic History    Marital status: Single     Spouse name: Not on file    Number of children: Not on file    Years of education: Not on file    Highest education level: Not on file   Occupational History    Not on file   Tobacco Use    Smoking status: Never Smoker    Smokeless tobacco: Never Used   Substance and Sexual Activity    Alcohol use:  Yes     Alcohol/week: 6.0 standard drinks     Types: 6 Cans of beer per week    Drug use: No    Sexual activity: Yes     Partners: Male     Birth control/protection: Condom   Other Topics Concern    Not on file   Social History Narrative    Not on file     Social Determinants of Health     Financial Resource Strain: Low Risk     Difficulty of Paying Living Expenses: Not hard at all   Food Insecurity: No Food Insecurity    Worried About [facility] in the Last Year: Never true    Ran Out of Food in the Last Year: Never true   Transportation Needs: No Transportation Needs    Lack of Transportation (Medical): No    Lack of Transportation (Non-Medical): No   Physical Activity:     Days of Exercise per Week: Not on file    Minutes of Exercise per Session: Not on file   Stress:     Feeling of Stress : Not on file   Social Connections:     Frequency of Communication with Friends and Family: Not on file    Frequency of Social Gatherings with Friends and Family: Not on file    Attends [employer] Services: Not on file    Active Member of [address] [place] [address] or Organizations: Not on file    Attends Club or Organization Meetings: Not on file    Marital Status: Not on file   Intimate Partner Violence:     Fear of Current or Ex-Partner: Not on file    Emotionally Abused: Not on file    Physically Abused: Not on file    Sexually Abused: Not on file   Housing Stability: Unknown    Unable to Pay for Housing in the Last Year: No    Number of [place] in the Last Year: Not on file    Unstable Housing in the Last Year: No     Allergies   Allergen Reactions    Bactrim [Sulfamethoxazole-Trimethoprim] Hives     No outpatient medications have been marked as taking for the 3/10/22 encounter (E-Visit) with Barbara Villegas MD.     Family History   Problem Relation Age of Onset    Anxiety Disorder Mother     Colon Cancer Maternal Grandfather 80    Stroke Maternal Grandfather 80    Breast Cancer Paternal Grandmother 76    Elevated Lipids Paternal Grandfather     Hypertension Paternal Grandfather     Kidney Disease Paternal Grandfather     Diabetes Paternal Grandfather     Heart Disease Maternal Aunt 62        Cardiomyopathy- sudden death     There were no vitals taken for this visit. Plan-dysuria and urinary frequency  -concerned about UTI.  Will call in Ciprofloxacin and a diflucan. Order a urine culture and urine for gonorrhea/chlamydia to be sure. Patient to make sure she is not pregnant.

## 2022-05-17 ENCOUNTER — OFFICE VISIT (OUTPATIENT)
Dept: GYNECOLOGY | Age: 32
End: 2022-05-17
Payer: COMMERCIAL

## 2022-05-17 VITALS
SYSTOLIC BLOOD PRESSURE: 110 MMHG | BODY MASS INDEX: 25.73 KG/M2 | HEART RATE: 75 BPM | WEIGHT: 183.8 LBS | RESPIRATION RATE: 17 BRPM | DIASTOLIC BLOOD PRESSURE: 66 MMHG | HEIGHT: 71 IN | OXYGEN SATURATION: 99 %

## 2022-05-17 DIAGNOSIS — Z01.419 WELL WOMAN EXAM WITH ROUTINE GYNECOLOGICAL EXAM: Primary | ICD-10-CM

## 2022-05-17 DIAGNOSIS — Z11.3 ROUTINE SCREENING FOR STI (SEXUALLY TRANSMITTED INFECTION): ICD-10-CM

## 2022-05-17 PROCEDURE — 99395 PREV VISIT EST AGE 18-39: CPT | Performed by: OBSTETRICS & GYNECOLOGY

## 2022-05-17 RX ORDER — DROSPIRENONE AND ETHINYL ESTRADIOL 0.02-3(28)
1 KIT ORAL DAILY
Qty: 84 TABLET | Refills: 3 | Status: SHIPPED | OUTPATIENT
Start: 2022-05-17 | End: 2022-09-07 | Stop reason: ALTCHOICE

## 2022-05-18 LAB
C TRACH DNA GENITAL QL NAA+PROBE: NEGATIVE
N. GONORRHOEAE DNA: NEGATIVE

## 2022-05-18 ASSESSMENT — ENCOUNTER SYMPTOMS
GASTROINTESTINAL NEGATIVE: 1
RESPIRATORY NEGATIVE: 1
EYES NEGATIVE: 1

## 2022-05-18 NOTE — PROGRESS NOTES
Subjective:      Patient ID: Jacob Katz is a 28 y.o. female. Patient is here for annual. Patient happy with Shahrzad Guillen. Wants dna probe. Gynecologic Exam        Review of Systems   Constitutional: Negative. HENT: Negative. Eyes: Negative. Respiratory: Negative. Cardiovascular: Negative. Gastrointestinal: Negative. Genitourinary: Negative. Musculoskeletal: Negative. Skin: Negative. Neurological: Negative. Psychiatric/Behavioral: Negative. Date of Birth 1990  Past Medical History:   Diagnosis Date    Anxiety     During college    Depression     During college    GERD (gastroesophageal reflux disease)     Low back pain     Migraine with aura 11/3/2021    Stress incontinence in female     Since childhood- associated with frequent UTIs     Past Surgical History:   Procedure Laterality Date    APPENDECTOMY  2012    TYMPANOSTOMY TUBE PLACEMENT Bilateral     Multiple sets throughout childhood     OB History    Para Term  AB Living   0 0 0 0 0 0   SAB IAB Ectopic Molar Multiple Live Births   0 0 0 0 0 0     Social History     Socioeconomic History    Marital status: Single     Spouse name: Not on file    Number of children: Not on file    Years of education: Not on file    Highest education level: Not on file   Occupational History    Not on file   Tobacco Use    Smoking status: Never Smoker    Smokeless tobacco: Never Used   Substance and Sexual Activity    Alcohol use:  Yes     Alcohol/week: 6.0 standard drinks     Types: 6 Cans of beer per week    Drug use: No    Sexual activity: Yes     Partners: Male     Birth control/protection: Condom   Other Topics Concern    Not on file   Social History Narrative    Not on file     Social Determinants of Health     Financial Resource Strain: Low Risk     Difficulty of Paying Living Expenses: Not hard at all   Food Insecurity: No Food Insecurity    Worried About [address] [place] in the Last Year: Never true    Ran Out of Food in the Last Year: Never true   Transportation Needs: No Transportation Needs    Lack of Transportation (Medical): No    Lack of Transportation (Non-Medical):  No   Physical Activity:     Days of Exercise per Week: Not on file    Minutes of Exercise per Session: Not on file   Stress:     Feeling of Stress : Not on file   Social Connections:     Frequency of Communication with Friends and Family: Not on file    Frequency of Social Gatherings with Friends and Family: Not on file    Attends Zoroastrian Services: Not on file    Active Member of [address] [place] [address] [employer] or Organizations: Not on file    Attends Club or Organization Meetings: Not on file    Marital Status: Not on file   Intimate Partner Violence:     Fear of Current or Ex-Partner: Not on file    Emotionally Abused: Not on file    Physically Abused: Not on file    Sexually Abused: Not on file   Housing Stability: Unknown    Unable to Pay for Housing in the Last Year: No    Number of [place] in the Last Year: Not on file    Unstable Housing in the Last Year: No     Allergies   Allergen Reactions    Bactrim [Sulfamethoxazole-Trimethoprim] Hives     Outpatient Medications Marked as Taking for the 5/17/22 encounter (Office Visit) with Vanita Ferreira MD   Medication Sig Dispense Refill    drospirenone-ethinyl estradiol (NEW) 3-0.02 MG per tablet Take 1 tablet by mouth daily 84 tablet 3    promethazine (PHENERGAN) 25 MG tablet Take 1 tablet by mouth every 6 hours as needed for Nausea 15 tablet 1    ondansetron (ZOFRAN ODT) 4 MG disintegrating tablet Take 1 tablet by mouth every 8 hours as needed for Nausea 12 tablet 1     Family History   Problem Relation Age of Onset    Anxiety Disorder Mother     Cancer Maternal Grandmother         breast cancer    Colon Cancer Maternal Grandfather 80    Stroke Maternal Grandfather 80    Cancer Paternal Grandmother         breast    Breast Cancer Paternal Grandmother 76    Elevated Lipids Paternal Grandfather     Hypertension Paternal Grandfather     Kidney Disease Paternal Grandfather     Diabetes Paternal Grandfather     Heart Disease Maternal Aunt 62        Cardiomyopathy- sudden death     /66 (Site: Right Upper Arm, Position: Sitting, Cuff Size: Medium Adult)   Pulse 75   Resp 17   Ht 5' 11\" (1.803 m)   Wt 183 lb 12.8 oz (83.4 kg)   LMP 04/27/2022   SpO2 99%   BMI 25.63 kg/m²       Objective:   Physical Exam  Constitutional:       Appearance: Normal appearance. She is well-developed and normal weight. HENT:      Head: Normocephalic. Nose: Nose normal.      Mouth/Throat:      Mouth: Mucous membranes are moist.      Pharynx: Oropharynx is clear. Eyes:      Pupils: Pupils are equal, round, and reactive to light. Neck:      Thyroid: No thyromegaly. Cardiovascular:      Rate and Rhythm: Normal rate and regular rhythm. Pulses: Normal pulses. Heart sounds: Normal heart sounds. No murmur heard. No friction rub. No gallop. Pulmonary:      Effort: Pulmonary effort is normal. No respiratory distress. Breath sounds: Normal breath sounds. No stridor. No wheezing, rhonchi or rales. Chest:      Chest wall: No tenderness. Breasts:      Right: Normal. No swelling, bleeding, inverted nipple, mass, nipple discharge, skin change or tenderness. Left: Normal. No swelling, bleeding, inverted nipple, mass, nipple discharge, skin change or tenderness. Abdominal:      General: Bowel sounds are normal. There is no distension. Palpations: Abdomen is soft. There is no mass. Tenderness: There is no abdominal tenderness. There is no guarding or rebound. Hernia: No hernia is present. There is no hernia in the left inguinal area. Genitourinary:     General: Normal vulva. Exam position: Lithotomy position. Pubic Area: No rash. Labia:         Right: No rash, tenderness, lesion or injury.          Left: No rash, tenderness, lesion or injury. Urethra: No prolapse, urethral pain, urethral swelling or urethral lesion. Vagina: No signs of injury and foreign body. No vaginal discharge, erythema, tenderness, bleeding, lesions or prolapsed vaginal walls. Cervix: No cervical motion tenderness, discharge, friability, lesion, erythema, cervical bleeding or eversion. Uterus: Not deviated, not enlarged, not fixed and not tender. Adnexa:         Right: No mass, tenderness or fullness. Left: No mass, tenderness or fullness. Rectum: No anal fissure or external hemorrhoid. Comments: Normal urethral meatus, normal urethra, nl bladder  Musculoskeletal:         General: No tenderness. Normal range of motion. Cervical back: Normal range of motion and neck supple. No rigidity. Lymphadenopathy:      Cervical: No cervical adenopathy. Lower Body: No right inguinal adenopathy. No left inguinal adenopathy. Skin:     General: Skin is warm and dry. Coloration: Skin is not pale. Findings: No erythema or rash. Neurological:      General: No focal deficit present. Mental Status: She is alert and oriented to person, place, and time. Mental status is at baseline. Deep Tendon Reflexes: Reflexes are normal and symmetric. Psychiatric:         Mood and Affect: Mood normal.         Behavior: Behavior normal.         Thought Content: Thought content normal.         Judgment: Judgment normal.         Assessment:      1. Annual  2. Birth control  3. Questions about STI      Plan:      1. Pap, calcium, exercise  2. Refill christo  3. dna probe today. Discussed not vaccinated for hep B.  Told her she can get at [facility] or Kristal Brush MD

## 2022-06-03 ENCOUNTER — TELEMEDICINE (OUTPATIENT)
Dept: INTERNAL MEDICINE CLINIC | Age: 32
End: 2022-06-03
Payer: COMMERCIAL

## 2022-06-03 DIAGNOSIS — G43.109 MIGRAINE WITH AURA AND WITHOUT STATUS MIGRAINOSUS, NOT INTRACTABLE: Primary | ICD-10-CM

## 2022-06-03 PROCEDURE — 99214 OFFICE O/P EST MOD 30 MIN: CPT | Performed by: INTERNAL MEDICINE

## 2022-06-03 RX ORDER — ACETAMINOPHEN, ASPIRIN AND CAFFEINE 250; 250; 65 MG/1; MG/1; MG/1
1 TABLET, FILM COATED ORAL EVERY 6 HOURS PRN
COMMUNITY

## 2022-06-03 ASSESSMENT — PATIENT HEALTH QUESTIONNAIRE - PHQ9
SUM OF ALL RESPONSES TO PHQ QUESTIONS 1-9: 0
1. LITTLE INTEREST OR PLEASURE IN DOING THINGS: 0
SUM OF ALL RESPONSES TO PHQ QUESTIONS 1-9: 0
SUM OF ALL RESPONSES TO PHQ QUESTIONS 1-9: 0
SUM OF ALL RESPONSES TO PHQ9 QUESTIONS 1 & 2: 0
SUM OF ALL RESPONSES TO PHQ QUESTIONS 1-9: 0
2. FEELING DOWN, DEPRESSED OR HOPELESS: 0

## 2022-06-03 NOTE — PROGRESS NOTES
Date of Visit:  6/3/2022    CC: Jacob Katz (: 1990) is a 28 y.o. female, established patient, here for evaluation/re-evaluation of the following medical concerns:    ASSESSMENT/PLAN:  Migraine with aura and without status migrainosus, not intractable  Assessment & Plan:  Decreased frequency overall, but episodes that do occur tend to last for several days. Most recent episode likely triggered by placebo week with OCP, so she will discuss continuous use with her GYN. Another option would be to switch from Imitrex to Frova for its longer duration of action. She will consider trying a daily magnesium supplement for prophylaxis. Patient will call if symptoms change or worsen. Return in about 6 months (around 12/3/2022) for CPE. Patient-Reported Vitals 6/3/2022   Patient-Reported Weight 180   Patient-Reported Height 60   Patient-Reported Temperature -        Wt Readings from Last 3 Encounters:   22 183 lb 12.8 oz (83.4 kg)   21 180 lb (81.6 kg)   04/15/21 179 lb (81.2 kg)     BP Readings from Last 3 Encounters:   22 110/66   21 128/78   04/15/21 (!) 125/57     Estimated body mass index is 25.63 kg/m² as calculated from the following:    Height as of 22: 5' 11\" (1.803 m). Weight as of 22: 183 lb 12.8 oz (83.4 kg). HPI  Chronic Headache:  Patient presents for follow-up of migraine headache. Episodes have been occuring about 1 times per 3 months and have been decreasing in both severity and frequency over time, but had 3 day HA about 1 week ago which occurred after switching from active OCP to placebo and drinking red wine. Recent change in symptom quality or new associated symptoms:  no.  Current triggers:  irregular sleep schedule, menses and ETOH. Current abortive treatment includes triptan- Imitrex 100 mg, anti-emetic- Zofran- partially effective, has not needed phenergan yet. Preventive treatment: none. Medication side effects: none.   Emergency department/urgent care visits for headache since last visit: none. Recent diagnostic testing: did not complete head CT ordered 8/21. ROS  As documented above    Physical Exam  Constitutional:       General: She is not in acute distress. Appearance: She is well-developed. HENT:      Head: Normocephalic and atraumatic. Mouth/Throat:      Lips: No lesions. Neck:      Comments: No visible mass  Pulmonary:      Effort: Pulmonary effort is normal. No respiratory distress. Skin:     Comments: No rash, erythema or other discoloration on facial skin and exposed areas of neck and upper extremites    Neurological:      Mental Status: She is alert. Comments: No facial asymmetry (cranial nerve 7 motor function) or gaze palsy   Psychiatric:         Attention and Perception: Attention normal.         Mood and Affect: Mood normal.         Speech: Speech normal.         Behavior: Behavior normal.         Thought Content: Thought content normal.         Cognition and Memory: Cognition normal.       On this date 6/3/2022 I have spent 30 minutes reviewing previous notes, test results and face to face with the patient discussing the diagnosis and importance of compliance with the treatment plan as well as documenting on the day of the visit. Diane Katz was evaluated through a synchronous (real-time) audio-video encounter. The patient (or guardian if applicable) is aware that this is a billable service, which includes applicable co-pays. This Virtual Visit was conducted with patient's (and/or legal guardian's) consent. The visit was conducted pursuant to the emergency declaration under the [facility], [address] [place] [address] authority and the [employer] General Act. Patient identification was verified, and a caregiver was present when appropriate. The patient was located at Home: [address] [place] [id number].    Provider was located at Home ([place] [address]): Tomy Byers MD on 6/3/2022 at 3:24 PM    An electronic signature was used to authenticate this note.

## 2022-06-03 NOTE — ASSESSMENT & PLAN NOTE
Decreased frequency overall, but episodes that do occur tend to last for several days. Most recent episode likely triggered by placebo week with OCP, so she will discuss continuous use with her GYN. Another option would be to switch from Imitrex to Frova for its longer duration of action. She will consider trying a daily magnesium supplement for prophylaxis. Patient will call if symptoms change or worsen.

## 2022-09-07 ENCOUNTER — TELEMEDICINE (OUTPATIENT)
Dept: PRIMARY CARE CLINIC | Age: 32
End: 2022-09-07

## 2022-09-07 DIAGNOSIS — R82.998 URINE WBC INCREASED: Primary | ICD-10-CM

## 2022-09-07 DIAGNOSIS — M54.50 ACUTE MIDLINE LOW BACK PAIN WITHOUT SCIATICA: ICD-10-CM

## 2022-09-07 DIAGNOSIS — J02.9 SORE THROAT: ICD-10-CM

## 2022-09-07 DIAGNOSIS — R68.89 FLU-LIKE SYMPTOMS: ICD-10-CM

## 2022-09-07 DIAGNOSIS — R68.89 FLU-LIKE SYMPTOMS: Primary | ICD-10-CM

## 2022-09-07 DIAGNOSIS — N30.90 CYSTITIS: ICD-10-CM

## 2022-09-07 LAB
BACTERIA: ABNORMAL /HPF
BILIRUBIN URINE: NEGATIVE
BLOOD, URINE: NEGATIVE
CLARITY: CLEAR
COLOR: YELLOW
EPITHELIAL CELLS, UA: 4 /HPF (ref 0–5)
GLUCOSE URINE: NEGATIVE MG/DL
HYALINE CASTS: 0 /LPF (ref 0–8)
KETONES, URINE: NEGATIVE MG/DL
LEUKOCYTE ESTERASE, URINE: ABNORMAL
MICROSCOPIC EXAMINATION: YES
NITRITE, URINE: NEGATIVE
PH UA: 7 (ref 5–8)
PROTEIN UA: NEGATIVE MG/DL
RBC UA: 4 /HPF (ref 0–4)
REASON FOR REJECTION: NORMAL
REJECTED TEST: NORMAL
SPECIFIC GRAVITY UA: 1 (ref 1–1.03)
URINE REFLEX TO CULTURE: YES
URINE TYPE: ABNORMAL
UROBILINOGEN, URINE: 0.2 E.U./DL
WBC UA: 17 /HPF (ref 0–5)

## 2022-09-07 PROCEDURE — 99213 OFFICE O/P EST LOW 20 MIN: CPT | Performed by: NURSE PRACTITIONER

## 2022-09-07 ASSESSMENT — ENCOUNTER SYMPTOMS
BACK PAIN: 1
SORE THROAT: 1

## 2022-09-07 NOTE — PROGRESS NOTES
Mervin Katz (:  1990) is a Established patient, here for evaluation of the following: Flu and cold-like symptoms, runny nose, sore throat and chills, lower back bilateral pain. Symptoms started Monday night    Assessment & Plan   Below is the assessment and plan developed based on review of pertinent history, physical exam, labs, studies, and medications. 1. Flu-like symptoms  Problem  -      Rapid influenza A/B antigens                            Future, Expected: 2022, Expires: 10/7/2022, Routine, Lab Collect         2. Acute midline low back pain without sciatica  Problem  -       Urinalysis with Reflex to Culture                                Routine, Lab Collect, Future, Expected: 2022, Expires: 10/7/2022       3. Sore throat  Problem  -     COVID-19; Future  She was instructed she needs to quarantine until we get these results back. If she gets the test done today we will know tomorrow and a return to work was given for  if she is not COVID-positive    Follow-up with PCP if symptoms do not improve or if they worsen  Follow with PCP for normal and usual care    Subjective   This is a 52-year-old female patient of Dr. Dontrell Smith consenting to a virtual visit today  She has complaints of cold-like symptoms with a runny nose along with flulike symptoms with sore throat and chills and lower back pain. Her symptoms started Monday night and has lasted through yesterday and today. She took NyQuil and Tylenol with little effect. She denies any urinary symptoms at this time. She denies no fevers but she did admit to the chills. She denies no urgency or frequency or burning with urination. She states this morning she did a COVID at home test and it was negative. Her last menstrual period was . Review of Systems   Constitutional:  Positive for chills. HENT:  Positive for sore throat. Musculoskeletal:  Positive for back pain (Lower bilateral back pain).    All other systems reviewed and are negative. Objective   Patient-Reported Vitals  No data recorded     Physical Exam  [INSTRUCTIONS:  \"[x]\" Indicates a positive item  \"[]\" Indicates a negative item  -- DELETE ALL ITEMS NOT EXAMINED]    Constitutional: [x] Appears well-developed and well-nourished [x] No apparent distress      [] Abnormal -     Mental status: [x] Alert and awake  [x] Oriented to person/place/time [x] Able to follow commands    [] Abnormal -     Eyes:   EOM    [x]  Normal    [] Abnormal -   Sclera  [x]  Normal    [] Abnormal -          Discharge [x]  None visible   [] Abnormal -     HENT: [x] Normocephalic, atraumatic  [] Abnormal -   [] Mouth/Throat: Mucous membranes are moist    External Ears [] Normal  [] Abnormal -    Neck: [x] No visualized mass [] Abnormal -     Pulmonary/Chest: [x] Respiratory effort normal   [x] No visualized signs of difficulty breathing or respiratory distress        [] Abnormal -      Musculoskeletal:   [] Normal gait with no signs of ataxia         [x] Normal range of motion of neck        [] Abnormal -     Neurological:        [x] No Facial Asymmetry (Cranial nerve 7 motor function) (limited exam due to video visit)          [] No gaze palsy        [] Abnormal -          Skin:        [x] No significant exanthematous lesions or discoloration noted on facial skin         [] Abnormal -            Psychiatric:       [x] Normal Affect [] Abnormal -       On this date 9/7/2022 I have spent 20 minutes reviewing previous notes, test results and face to face (virtual) with the patient discussing the diagnosis and importance of compliance with the treatment plan as well as documenting on the day of the visitNish Katz, was evaluated through a synchronous (real-time) audio-video encounter. The patient (or guardian if applicable) is aware that this is a billable service, which includes applicable co-pays.  This Virtual Visit was conducted with patient's (and/or legal guardian's) consent. The visit was conducted pursuant to the emergency declaration under the [facility], [address] [place] [address] authority and the [employer] and [employer] General Act. Patient identification was verified, and a caregiver was present when appropriate. The patient was located at Other: work in her car .    Provider was located at Home ([address]): Janeth We-07-A [id number] TOR Wu CNP

## 2022-09-07 NOTE — LETTER
[id number]  [facility] [address]  Phone: [phone]  Fax: [phone]    TOR Love CNP        September 7, 2022     Patient: Ena Katz   YOB: 1990   Date of Visit: 9/7/2022       To Whom it May Concern:    Ena Katz was seen in my clinic on 9/7/2022. She may return to work on September 9, 2022. If you have any questions or concerns, please don't hesitate to call.     Sincerely,         TOR Aguirre - CNP

## 2022-09-08 ENCOUNTER — TELEPHONE (OUTPATIENT)
Dept: INTERNAL MEDICINE CLINIC | Age: 32
End: 2022-09-08

## 2022-09-08 LAB — SARS-COV-2: NOT DETECTED

## 2022-09-08 RX ORDER — CIPROFLOXACIN 500 MG/1
500 TABLET, FILM COATED ORAL 2 TIMES DAILY
Qty: 10 TABLET | Refills: 0 | Status: SHIPPED | OUTPATIENT
Start: 2022-09-08 | End: 2022-09-13

## 2022-09-08 NOTE — TELEPHONE ENCOUNTER
Lashay with [facility] is calling to let Dr. Sae White know the \"rapid flu lab\" was rejected as it was collected in the wrong tube. Please contact Lashay at number provided to further discuss.

## 2022-09-08 NOTE — TELEPHONE ENCOUNTER
I am more concerned about COVID than flu at this point. Are they able to run the COVID test?  Also, please call patient to ask whether she is having any fevers or respiratory symptoms at this point.

## 2022-09-08 NOTE — TELEPHONE ENCOUNTER
Called and spoke with [facility] due to Clearence Josiane not available .  She stated the covid test is pending

## 2022-09-09 LAB — URINE CULTURE, ROUTINE: NORMAL

## 2022-11-01 DIAGNOSIS — R30.0 DYSURIA: ICD-10-CM

## 2022-11-02 ENCOUNTER — TELEPHONE (OUTPATIENT)
Dept: GYNECOLOGY | Age: 32
End: 2022-11-02

## 2022-11-02 LAB — URINE CULTURE, ROUTINE: NORMAL

## 2022-11-02 NOTE — TELEPHONE ENCOUNTER
Patient called in stated she received this message (see below) in her my chart, she is calling here to get an appointment  with dr. Xochitl Thomas , told patient dr. Xochitl Thomas will not be back until Thursday. She would like to know when she can be squeezed in for this vaginal culture she needs done. Patient phone number [phone]        MESSAGE SHE RECEIVED [facility] [address] [place] [address] [address],  I'm covering for Dr Xochitl Thomas who will return this Thursday. The labs you will need to see why you are having itchiness around your vagina are vaginal cultures. You may call her office for an appointment.   Thanks  Dr Wilfredo De Jesus

## 2022-11-03 LAB
C. TRACHOMATIS DNA ,URINE: NEGATIVE
N. GONORRHOEAE DNA, URINE: NEGATIVE

## 2022-11-03 NOTE — TELEPHONE ENCOUNTER
Patient initially requested something sooner. I let her know that Dr Cody Jenkins performs surgeries on Fridays so Monday would be the soonest. Patient then requested that one of the nurses just do the culture because its just a yeast infection. I explained to kiersten call and let her know that Dr Cody Jenkins doesn't currently have a nurse practitioner on sight. Patient then asked if she can just see a different doctor. I explained that she definitely could however she would need to establish care with them and stay. Patient then said that Monday morning doesn't work for her and she would need afternoon. I told her that I could send a message requesting an afternoon spot but I couldn't guarantee one at the moment. She then stated that this would be an urgent appointment so she needs to be accommodated. Patient also says that she was like referrals or suggestions from Dr Cody Jenkins of other places that she can go if she can't get her an afternoon appointment.

## 2022-11-03 NOTE — TELEPHONE ENCOUNTER
Patient called in today wanting to know the status of this. Let her know that Dr Valdo Guerra returns today.

## 2022-11-07 ENCOUNTER — TELEPHONE (OUTPATIENT)
Dept: INTERNAL MEDICINE CLINIC | Age: 32
End: 2022-11-07

## 2022-11-07 ENCOUNTER — TELEMEDICINE (OUTPATIENT)
Dept: INTERNAL MEDICINE CLINIC | Age: 32
End: 2022-11-07
Payer: COMMERCIAL

## 2022-11-07 DIAGNOSIS — J06.9 VIRAL URI: Primary | ICD-10-CM

## 2022-11-07 PROCEDURE — 99213 OFFICE O/P EST LOW 20 MIN: CPT | Performed by: INTERNAL MEDICINE

## 2022-11-07 SDOH — ECONOMIC STABILITY: FOOD INSECURITY: WITHIN THE PAST 12 MONTHS, YOU WORRIED THAT YOUR FOOD WOULD RUN OUT BEFORE YOU GOT MONEY TO BUY MORE.: NEVER TRUE

## 2022-11-07 SDOH — ECONOMIC STABILITY: FOOD INSECURITY: WITHIN THE PAST 12 MONTHS, THE FOOD YOU BOUGHT JUST DIDN'T LAST AND YOU DIDN'T HAVE MONEY TO GET MORE.: NEVER TRUE

## 2022-11-07 ASSESSMENT — SOCIAL DETERMINANTS OF HEALTH (SDOH): HOW HARD IS IT FOR YOU TO PAY FOR THE VERY BASICS LIKE FOOD, HOUSING, MEDICAL CARE, AND HEATING?: NOT VERY HARD

## 2022-11-07 NOTE — PROGRESS NOTES
Judge Amaury Katz (:  1990) is a Established patient, here for evaluation of the following:    Assessment & Plan   Below is the assessment and plan developed based on review of pertinent history, physical exam, labs, studies, and medications. 1. Viral URI  Symptoms likely secondary to viral URI. Home COVID test was negative. See HPI for details on current symptoms.  -Patient to continue supportive care including staying hydrated  -Patient can continue DayQuil and NyQuil and ibuprofen as needed  -We will obtain a rapid flu and RSV at the office today  -Patient advised to stay home from work until 24 hours of no fever  -Patient to call with no improvement or worsening of any symptoms  -     RAPID INFLUENZA A/B ANTIGENS; Future  -     Rapid RSV Antigen; Future  Return if symptoms worsen or fail to improve. Subjective   HPI    Patient is a 28-year-old female with past medical history of migraine and GERD who presents secondary to sick symptoms    Woke up on Friday with sore throat on the right side with some right ear discomfort. She also notes a lot of fatigue. Woke up Saturday still not feeling great. A lot of sinus pressure and behind eyes. She notes achy all over. Rapid covid test at home this morning was negative. She notes no fever that she knows of. She is taking ibuprofen. She works in an elementary school. Not really having cough. Denies any shortness of breath. Currently using DayQuil and NyQuil for symptomatic relief. Review of Systems ROS negative except for those noted in the HPI above.         Objective   Patient-Reported Vitals  Patient-Reported Weight: 180  Patient-Reported Height: 6'0       Physical Exam  [INSTRUCTIONS:  \"[x]\" Indicates a positive item  \"[]\" Indicates a negative item  -- DELETE ALL ITEMS NOT EXAMINED]    Constitutional: [x] Appears well-developed and well-nourished [x] No apparent distress      [] Abnormal -     Mental status: [x] Alert and awake  [x] Oriented to person/place/time [x] Able to follow commands    [] Abnormal -     Eyes:   EOM    [x]  Normal    [] Abnormal -   Sclera  [x]  Normal    [] Abnormal -          Discharge [x]  None visible   [] Abnormal -     HENT: [x] Normocephalic, atraumatic  [] Abnormal -   [x] Mouth/Throat: Mucous membranes are moist    External Ears [x] Normal  [] Abnormal -    Neck: [x] No visualized mass [] Abnormal -     Pulmonary/Chest: [x] Respiratory effort normal   [x] No visualized signs of difficulty breathing or respiratory distress        [] Abnormal -      Musculoskeletal:   [] Normal gait with no signs of ataxia         [x] Normal range of motion of neck        [] Abnormal -     Neurological:        [x] No Facial Asymmetry (Cranial nerve 7 motor function) (limited exam due to video visit)          [x] No gaze palsy        [] Abnormal -          Skin:        [x] No significant exanthematous lesions or discoloration noted on facial skin         [] Abnormal -            Psychiatric:       [x] Normal Affect [] Abnormal -        [x] No Hallucinations    Other pertinent observable physical exam findings:-         Kimberly Eugene Smiths, was evaluated through a synchronous (real-time) audio-video encounter. The patient (or guardian if applicable) is aware that this is a billable service, which includes applicable co-pays. This Virtual Visit was conducted with patient's (and/or legal guardian's) consent. The visit was conducted pursuant to the emergency declaration under the [address] [place] [address] authority and the [employer] General Act. Patient identification was verified, and a caregiver was present when appropriate. The patient was located at Home: [address] [place] [address]. Provider was located at [facility] (Appt Dept): [address] [facility],  [address] [facility].         --Michelle Delgado DO

## 2022-11-07 NOTE — TELEPHONE ENCOUNTER
Patient attempted to complete an evisit questionnaire but was directed to contact her provider. Patient awoke on Friday not feeling well and it's not gettig any better. Patient is experiencing sinus pressure behind her eyes and sore throat that's effecting her right ear. She is also experiencing body aches. Patient is scheduled with Dr. Christine Castanon today for a Virtual Visit. Covid test: Rapid home test was negative this morning  Fever greater than 100? Not that she is aware of          Cough? No  SOB/Difficulty breathing? No  Fatigue? Yes  Muscle aches/Body aches? Yes  Headaches? No  Sore throat? Yes  Congestion? Yes  Nasal Drainage? Yes  Loss of smell or taste? No  Nausea, Vomiting or Diarrhea? No    Patient is taking Day quil and Nyquil to try to relieve her sxs.

## 2022-11-07 NOTE — PROGRESS NOTES
Rosa Elena Katz (:  1990) is a {New vs Established:[id number]::\"Established patient\"}, here for evaluation of the following:    Assessment & Plan   Below is the assessment and plan developed based on review of pertinent history, physical exam, labs, studies, and medications. 1. Viral URI  -     RAPID INFLUENZA A/B ANTIGENS; Future  -     Rapid RSV Antigen; Future    No follow-ups on file. Subjective   HPI  Review of Systems       Objective   Patient-Reported Vitals  Patient-Reported Weight: 180  Patient-Reported Height: 6'0       Physical Exam  {Virtual visit PE with detailed exam Optional:[id number]}       {Time Documentation Optional:193927821}    Rosa Elena Katz, was evaluated through a synchronous (real-time) audio-video encounter. The patient (or guardian if applicable) is aware that this is a billable service, which includes applicable co-pays. This Virtual Visit was conducted with patient's (and/or legal guardian's) consent. The visit was conducted pursuant to the emergency declaration under the [address] [place] [address] authority and the [employer] and [employer] Act. Patient identification was verified, and a caregiver was present when appropriate. The patient was located at {[facility] POS - Patient:[id number]}. Provider was located at {[facility] POS - Provider:[id number]}.         --Elena Joshi DO

## 2022-11-08 ENCOUNTER — E-VISIT (OUTPATIENT)
Dept: GYNECOLOGY | Age: 32
End: 2022-11-08
Payer: COMMERCIAL

## 2022-11-08 ENCOUNTER — TELEPHONE (OUTPATIENT)
Dept: GYNECOLOGY | Age: 32
End: 2022-11-08

## 2022-11-08 DIAGNOSIS — N89.8 VAGINAL DISCHARGE: Primary | ICD-10-CM

## 2022-11-08 LAB
RAPID INFLUENZA  B AGN: NEGATIVE
RAPID INFLUENZA A AGN: NEGATIVE

## 2022-11-08 PROCEDURE — 99421 OL DIG E/M SVC 5-10 MIN: CPT | Performed by: OBSTETRICS & GYNECOLOGY

## 2022-11-08 RX ORDER — FLUCONAZOLE 150 MG/1
150 TABLET ORAL ONCE
Qty: 1 TABLET | Refills: 1 | Status: SHIPPED | OUTPATIENT
Start: 2022-11-08 | End: 2022-11-23 | Stop reason: SDUPTHER

## 2022-11-08 RX ORDER — CLINDAMYCIN PHOSPHATE 20 MG/G
1 CREAM VAGINAL NIGHTLY
Qty: 1 EACH | Refills: 1 | Status: SHIPPED | OUTPATIENT
Start: 2022-11-08 | End: 2022-11-15

## 2022-11-08 NOTE — TELEPHONE ENCOUNTER
Sundeep Katz is calling for Marcel Miller due to she tried to get on evisit and it says call your provider as soon as possible . Wouldn't let her go any further , please call call her back

## 2022-11-08 NOTE — TELEPHONE ENCOUNTER
Patient called you had squeezed her in yesterday , it was for vaginal culture.   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

## 2022-11-08 NOTE — TELEPHONE ENCOUNTER
Spoke with patient advised her to do an evisit, she says she knows how ro do it, will leave this message til she physically better to come in when we have a cancellation spot.

## 2022-11-08 NOTE — TELEPHONE ENCOUNTER
Ask patient if she is ill? Even if not covid-she cannot come in with symptoms until she feels better. She could do an e-visit with her symptoms or we can get her in sometime next week.

## 2022-11-08 NOTE — PROGRESS NOTES
Patient is a 28year old F with yellow/green discharge. Patient recently on Cipro. Review of Systems: as above  General ROS: negative  Psychological ROS: negative  Ophthalmic ROS: negative  ENT ROS: negative  Allergy and Immunology ROS: negative  Hematological and Lymphatic ROS: negative  Endocrine ROS: negative  Breast ROS: negative  Respiratory ROS: negative  Cardiovascular ROS: negative  Gastrointestinal ROS: negative  Genito-Urinary ROS: as above  Musculoskeletal ROS: negative  Neurological ROS: negative  Dermatological ROS: negative     Date of Birth 1990  Past Medical History:   Diagnosis Date    Anxiety     During college    Depression     During college    GERD (gastroesophageal reflux disease)     Low back pain     Migraine with aura 11/3/2021    Stress incontinence in female     Since childhood- associated with frequent UTIs     Past Surgical History:   Procedure Laterality Date    APPENDECTOMY  2012    TYMPANOSTOMY TUBE PLACEMENT Bilateral     Multiple sets throughout childhood     OB History    Para Term  AB Living   0 0 0 0 0 0   SAB IAB Ectopic Molar Multiple Live Births   0 0 0 0 0 0     Social History     Socioeconomic History    Marital status: Single     Spouse name: Not on file    Number of children: Not on file    Years of education: Not on file    Highest education level: Not on file   Occupational History    Not on file   Tobacco Use    Smoking status: Never    Smokeless tobacco: Never   Substance and Sexual Activity    Alcohol use:  Yes     Alcohol/week: 6.0 standard drinks     Types: 6 Cans of beer per week    Drug use: No    Sexual activity: Yes     Partners: Male     Birth control/protection: Condom   Other Topics Concern    Not on file   Social History Narrative    Not on file     Social Determinants of Health     Financial Resource Strain: Low Risk     Difficulty of Paying Living Expenses: Not very hard   Food Insecurity: No Food Insecurity    Worried About Running Out of Food in the Last Year: Never true    Ran Out of Food in the Last Year: Never true   Transportation Needs: Not on file   Physical Activity: Not on file   Stress: Not on file   Social Connections: Not on file   Intimate Partner Violence: Not on file   Housing Stability: Not on file     Allergies   Allergen Reactions    Bactrim [Sulfamethoxazole-Trimethoprim] Hives     No outpatient medications have been marked as taking for the 11/8/22 encounter (E-Visit) with Anil Childs MD.     Family History   Problem Relation Age of Onset    Anxiety Disorder Mother     Cancer Maternal Grandmother         breast cancer    Colon Cancer Maternal Grandfather 80    Stroke Maternal Grandfather 80    Cancer Paternal Grandmother         breast    Breast Cancer Paternal Grandmother 76    Elevated Lipids Paternal Grandfather     Hypertension Paternal Grandfather     Kidney Disease Paternal Grandfather     Diabetes Paternal Grandfather     Heart Disease Maternal Aunt 62        Cardiomyopathy- sudden death     There were no vitals taken for this visit. Patient is a 28year old F with yellow/green discharge. -No itching but has used monistat in past with some relief.    Will proceed with cleocin vaginal cream and Diflucan tablet.   -can drop off urine for gc/chlamydia if wants

## 2022-11-23 RX ORDER — FLUCONAZOLE 150 MG/1
150 TABLET ORAL ONCE
Qty: 1 TABLET | Refills: 1 | Status: SHIPPED | OUTPATIENT
Start: 2022-11-23 | End: 2022-11-23

## 2022-12-06 ENCOUNTER — E-VISIT (OUTPATIENT)
Dept: GYNECOLOGY | Age: 32
End: 2022-12-06
Payer: COMMERCIAL

## 2022-12-06 DIAGNOSIS — R30.0 DYSURIA: ICD-10-CM

## 2022-12-06 DIAGNOSIS — R30.0 DYSURIA: Primary | ICD-10-CM

## 2022-12-06 PROCEDURE — 99421 OL DIG E/M SVC 5-10 MIN: CPT | Performed by: OBSTETRICS & GYNECOLOGY

## 2022-12-06 RX ORDER — FLUCONAZOLE 150 MG/1
150 TABLET ORAL ONCE
Qty: 1 TABLET | Refills: 1 | Status: SHIPPED | OUTPATIENT
Start: 2022-12-06 | End: 2022-12-06

## 2022-12-06 RX ORDER — CIPROFLOXACIN 500 MG/1
500 TABLET, FILM COATED ORAL 2 TIMES DAILY
Qty: 14 TABLET | Refills: 0 | Status: SHIPPED | OUTPATIENT
Start: 2022-12-06 | End: 2022-12-13

## 2022-12-06 NOTE — PROGRESS NOTES
Patient is a 28year old F who now has concerns about urinary frequency and discomfort. Patient with strong odor. Review of Systems: as above  General ROS: negative  Psychological ROS: negative  Ophthalmic ROS: negative  ENT ROS: negative  Allergy and Immunology ROS: negative  Hematological and Lymphatic ROS: negative  Endocrine ROS: negative  Breast ROS: negative  Respiratory ROS: negative  Cardiovascular ROS: negative  Gastrointestinal ROS: negative  Genito-Urinary ROS: as above  Musculoskeletal ROS: negative  Neurological ROS: negative  Dermatological ROS: negative     Date of Birth 1990  Past Medical History:   Diagnosis Date    Anxiety     During college    Depression     During college    GERD (gastroesophageal reflux disease)     Low back pain     Migraine with aura 11/3/2021    Stress incontinence in female     Since childhood- associated with frequent UTIs     Past Surgical History:   Procedure Laterality Date    APPENDECTOMY  2012    TYMPANOSTOMY TUBE PLACEMENT Bilateral     Multiple sets throughout childhood     OB History    Para Term  AB Living   0 0 0 0 0 0   SAB IAB Ectopic Molar Multiple Live Births   0 0 0 0 0 0     Social History     Socioeconomic History    Marital status: Single     Spouse name: Not on file    Number of children: Not on file    Years of education: Not on file    Highest education level: Not on file   Occupational History    Not on file   Tobacco Use    Smoking status: Never    Smokeless tobacco: Never   Substance and Sexual Activity    Alcohol use:  Yes     Alcohol/week: 6.0 standard drinks     Types: 6 Cans of beer per week    Drug use: No    Sexual activity: Yes     Partners: Male     Birth control/protection: Condom   Other Topics Concern    Not on file   Social History Narrative    Not on file     Social Determinants of Health     Financial Resource Strain: Low Risk     Difficulty of Paying Living Expenses: Not very hard   Food Insecurity: No Food Insecurity    Worried About Running Out of Food in the Last Year: Never true    Ran Out of Food in the Last Year: Never true   Transportation Needs: Not on file   Physical Activity: Not on file   Stress: Not on file   Social Connections: Not on file   Intimate Partner Violence: Not on file   Housing Stability: Not on file     Allergies   Allergen Reactions    Bactrim [Sulfamethoxazole-Trimethoprim] Hives     No outpatient medications have been marked as taking for the 12/6/22 encounter (E-Visit) with Nicole Lucas MD.     Family History   Problem Relation Age of Onset    Anxiety Disorder Mother     Cancer Maternal Grandmother         breast cancer    Colon Cancer Maternal Grandfather 80    Stroke Maternal Grandfather 80    Cancer Paternal Grandmother         breast    Breast Cancer Paternal Grandmother 76    Elevated Lipids Paternal Grandfather     Hypertension Paternal Grandfather     Kidney Disease Paternal Grandfather     Diabetes Paternal Grandfather     Heart Disease Maternal Aunt 62        Cardiomyopathy- sudden death     There were no vitals taken for this visit. Plan-patient is a 28year old F with   Dysuria/urinary frequency.  Will call in antibiotics for UTI-cipro and call in Diflucan for yeast.          Patient also needs to drop off her urine for GC/Chl

## 2022-12-06 NOTE — TELEPHONE ENCOUNTER
From: Dr. Herlinda Jeans  To: Marcie Katz  Sent: 12/6/2022 9:46 AM EST  Subject: urinary symptoms    Hello,     I have called in some Ciprofloxacin to take twice a day for 7 days. I also sent I a diflucan to take right after the cipro in case you get a yeast infection. Please-drop off a urine for culture and for gonorrhea/chlamydia to your local [id number] [facility] labs.      Thank you,    Yolanda Vides

## 2022-12-08 LAB
C. TRACHOMATIS DNA ,URINE: NEGATIVE
N. GONORRHOEAE DNA, URINE: NEGATIVE
URINE CULTURE, ROUTINE: NORMAL

## 2022-12-19 RX ORDER — FLUCONAZOLE 150 MG/1
150 TABLET ORAL ONCE
Qty: 1 TABLET | Refills: 1 | Status: SHIPPED | OUTPATIENT
Start: 2022-12-19 | End: 2022-12-19

## 2023-05-04 NOTE — PROGRESS NOTES
note.
No. KASIA screening performed.  STOP BANG Legend: 0-2 = LOW Risk; 3-4 = INTERMEDIATE Risk; 5-8 = HIGH Risk

## 2023-05-18 ENCOUNTER — OFFICE VISIT (OUTPATIENT)
Dept: GYNECOLOGY | Age: 33
End: 2023-05-18

## 2023-05-18 VITALS
RESPIRATION RATE: 17 BRPM | HEART RATE: 84 BPM | OXYGEN SATURATION: 100 % | HEIGHT: 71 IN | DIASTOLIC BLOOD PRESSURE: 75 MMHG | WEIGHT: 193.6 LBS | BODY MASS INDEX: 27.1 KG/M2 | SYSTOLIC BLOOD PRESSURE: 110 MMHG

## 2023-05-18 DIAGNOSIS — Z01.419 WELL WOMAN EXAM WITH ROUTINE GYNECOLOGICAL EXAM: Primary | ICD-10-CM

## 2023-05-21 ASSESSMENT — ENCOUNTER SYMPTOMS
GASTROINTESTINAL NEGATIVE: 1
ALLERGIC/IMMUNOLOGIC NEGATIVE: 1
EYES NEGATIVE: 1
RESPIRATORY NEGATIVE: 1

## 2024-02-06 ENCOUNTER — PATIENT MESSAGE (OUTPATIENT)
Dept: INTERNAL MEDICINE CLINIC | Age: 34
End: 2024-02-06

## 2024-02-06 NOTE — TELEPHONE ENCOUNTER
From: Sarika Katz  To: Dr. Rajani Starr  Sent: 2/6/2024 12:05 PM EST  Subject: Depression E-Visit    Dr. Starr,    I attempted to submit an e-visit for Anxiety/Depression concerns but it said it wasn’t submitted and to reach out to you directly. In college (10+ years ago) I struggled with seasonal depression, and over the past few weeks have had a lack of motivation and been feeling very down. I notice a lot of anxiety and lack of motivation around going to work in particular. I am looking into starting therapy but I was wondering if maybe my vitamin levels might be low? Or if there is anything else you can suggest.    Best,  Sarika Katz

## 2024-02-06 NOTE — TELEPHONE ENCOUNTER
The Evisit only works for stable symptoms. Since this is a new problem, needs video or in person visit- 30 minutes.

## 2024-02-07 PROBLEM — R53.83 OTHER FATIGUE: Status: ACTIVE | Noted: 2024-02-07

## 2024-02-07 NOTE — PROGRESS NOTES
Date of Visit:  2024    Sarika Katz, was evaluated through a synchronous (real-time) audio-video encounter. The patient (or guardian if applicable) is aware that this is a billable service, which includes applicable co-pays. This Virtual Visit was conducted with patient's (and/or legal guardian's) consent. Patient identification was verified, and a caregiver was present when appropriate.   The patient was located at Home: [address] [place] [address]  Provider was located at Facility (Appt Dept): [address] [place] [address]       CC: Sarika Katz (: 1990) is a 33 y.o. female, established patient, presenting virtually for evaluation/re-evaluation of the following medical concern(s):    ASSESSMENT/PLAN:  Below is the assessment and plan developed based on review of pertinent history, physical exam, labs, studies, and medications.  Current moderate episode of major depressive disorder without prior episode (HCC)  Assessment & Plan:  Worse with winter months so may have SAD component. Light therapy suggested. Unclear whether job stress is a significant trigger or if her mood symptoms are causing her to have difficulty performing a job that she actually enjoys. She will consider counseling if needs help sorting this out. Discussed potential risks and benefits of Wellbutrin XL- she would like to hold off until results of metabolic labs are available. Patient will call if symptoms change or worsen.    Other fatigue  Assessment & Plan:  Suspect related to mood symptoms, but will check labs to help rule out metabolic component.   Orders:  -     CBC with Auto Differential; Future  -     TSH with Reflex; Future  -     Comprehensive Metabolic Panel, Fasting; Future  -     Vitamin B12; Future  -     Vitamin D 25 Hydroxy; Future  -     Iron and TIBC; Future  -     Ferritin; Future      Return in about 1 month (around 3/9/2024) for 30 MIN F/U- Video.         2024    10:46 AM   Patient-Reported

## 2024-02-09 ENCOUNTER — TELEMEDICINE (OUTPATIENT)
Dept: INTERNAL MEDICINE CLINIC | Age: 34
End: 2024-02-09
Payer: COMMERCIAL

## 2024-02-09 DIAGNOSIS — F32.1 CURRENT MODERATE EPISODE OF MAJOR DEPRESSIVE DISORDER WITHOUT PRIOR EPISODE (HCC): Primary | ICD-10-CM

## 2024-02-09 DIAGNOSIS — R53.83 OTHER FATIGUE: ICD-10-CM

## 2024-02-09 PROCEDURE — 99214 OFFICE O/P EST MOD 30 MIN: CPT | Performed by: INTERNAL MEDICINE

## 2024-02-09 RX ORDER — METHYLPREDNISOLONE 4 MG/1
TABLET ORAL
COMMUNITY
Start: 2024-02-05

## 2024-02-09 SDOH — ECONOMIC STABILITY: INCOME INSECURITY: HOW HARD IS IT FOR YOU TO PAY FOR THE VERY BASICS LIKE FOOD, HOUSING, MEDICAL CARE, AND HEATING?: NOT HARD AT ALL

## 2024-02-09 SDOH — ECONOMIC STABILITY: FOOD INSECURITY: WITHIN THE PAST 12 MONTHS, THE FOOD YOU BOUGHT JUST DIDN'T LAST AND YOU DIDN'T HAVE MONEY TO GET MORE.: NEVER TRUE

## 2024-02-09 SDOH — ECONOMIC STABILITY: FOOD INSECURITY: WITHIN THE PAST 12 MONTHS, YOU WORRIED THAT YOUR FOOD WOULD RUN OUT BEFORE YOU GOT MONEY TO BUY MORE.: NEVER TRUE

## 2024-02-09 ASSESSMENT — PATIENT HEALTH QUESTIONNAIRE - PHQ9
10. IF YOU CHECKED OFF ANY PROBLEMS, HOW DIFFICULT HAVE THESE PROBLEMS MADE IT FOR YOU TO DO YOUR WORK, TAKE CARE OF THINGS AT HOME, OR GET ALONG WITH OTHER PEOPLE: 1
4. FEELING TIRED OR HAVING LITTLE ENERGY: 2
5. POOR APPETITE OR OVEREATING: 0
SUM OF ALL RESPONSES TO PHQ QUESTIONS 1-9: 10
SUM OF ALL RESPONSES TO PHQ QUESTIONS 1-9: 10
2. FEELING DOWN, DEPRESSED OR HOPELESS: 2
1. LITTLE INTEREST OR PLEASURE IN DOING THINGS: 2
SUM OF ALL RESPONSES TO PHQ9 QUESTIONS 1 & 2: 4
SUM OF ALL RESPONSES TO PHQ QUESTIONS 1-9: 10
3. TROUBLE FALLING OR STAYING ASLEEP: 0
SUM OF ALL RESPONSES TO PHQ QUESTIONS 1-9: 10
8. MOVING OR SPEAKING SO SLOWLY THAT OTHER PEOPLE COULD HAVE NOTICED. OR THE OPPOSITE, BEING SO FIGETY OR RESTLESS THAT YOU HAVE BEEN MOVING AROUND A LOT MORE THAN USUAL: 2
6. FEELING BAD ABOUT YOURSELF - OR THAT YOU ARE A FAILURE OR HAVE LET YOURSELF OR YOUR FAMILY DOWN: 0
7. TROUBLE CONCENTRATING ON THINGS, SUCH AS READING THE NEWSPAPER OR WATCHING TELEVISION: 2
9. THOUGHTS THAT YOU WOULD BE BETTER OFF DEAD, OR OF HURTING YOURSELF: 0

## 2024-02-09 NOTE — ASSESSMENT & PLAN NOTE
Worse with winter months so may have SAD component. Light therapy suggested. Unclear whether job stress is a significant trigger or if her mood symptoms are causing her to have difficulty performing a job that she actually enjoys. She will consider counseling if needs help sorting this out. Discussed potential risks and benefits of Wellbutrin XL- she would like to hold off until results of metabolic labs are available. Patient will call if symptoms change or worsen.

## 2024-02-10 DIAGNOSIS — R53.83 OTHER FATIGUE: ICD-10-CM

## 2024-02-10 LAB
25(OH)D3 SERPL-MCNC: 30.4 NG/ML
ALBUMIN SERPL-MCNC: 4.8 G/DL (ref 3.4–5)
ALBUMIN/GLOB SERPL: 1.8 {RATIO} (ref 1.1–2.2)
ALP SERPL-CCNC: 65 U/L (ref 40–129)
ALT SERPL-CCNC: 14 U/L (ref 10–40)
ANION GAP SERPL CALCULATED.3IONS-SCNC: 14 MMOL/L (ref 3–16)
AST SERPL-CCNC: 14 U/L (ref 15–37)
BASOPHILS # BLD: 0 K/UL (ref 0–0.2)
BASOPHILS NFR BLD: 0.5 %
BILIRUB SERPL-MCNC: 0.3 MG/DL (ref 0–1)
BUN SERPL-MCNC: 12 MG/DL (ref 7–20)
CALCIUM SERPL-MCNC: 9.4 MG/DL (ref 8.3–10.6)
CHLORIDE SERPL-SCNC: 100 MMOL/L (ref 99–110)
CO2 SERPL-SCNC: 24 MMOL/L (ref 21–32)
CREAT SERPL-MCNC: 0.5 MG/DL (ref 0.6–1.1)
DEPRECATED RDW RBC AUTO: 12.9 % (ref 12.4–15.4)
EOSINOPHIL # BLD: 0.1 K/UL (ref 0–0.6)
EOSINOPHIL NFR BLD: 0.8 %
FERRITIN SERPL IA-MCNC: 70.9 NG/ML (ref 15–150)
GFR SERPLBLD CREATININE-BSD FMLA CKD-EPI: >60 ML/MIN/{1.73_M2}
GLUCOSE P FAST SERPL-MCNC: 84 MG/DL (ref 70–99)
HCT VFR BLD AUTO: 43 % (ref 36–48)
HGB BLD-MCNC: 15.1 G/DL (ref 12–16)
IRON SATN MFR SERPL: 27 % (ref 15–50)
IRON SERPL-MCNC: 86 UG/DL (ref 37–145)
LYMPHOCYTES # BLD: 2.6 K/UL (ref 1–5.1)
LYMPHOCYTES NFR BLD: 28.7 %
MCH RBC QN AUTO: 32.2 PG (ref 26–34)
MCHC RBC AUTO-ENTMCNC: 35.2 G/DL (ref 31–36)
MCV RBC AUTO: 91.5 FL (ref 80–100)
MONOCYTES # BLD: 0.8 K/UL (ref 0–1.3)
MONOCYTES NFR BLD: 8.6 %
NEUTROPHILS # BLD: 5.5 K/UL (ref 1.7–7.7)
NEUTROPHILS NFR BLD: 61.4 %
PLATELET # BLD AUTO: 177 K/UL (ref 135–450)
PLATELET BLD QL SMEAR: ADEQUATE
PMV BLD AUTO: 11.2 FL (ref 5–10.5)
POTASSIUM SERPL-SCNC: 4.9 MMOL/L (ref 3.5–5.1)
PROT SERPL-MCNC: 7.5 G/DL (ref 6.4–8.2)
RBC # BLD AUTO: 4.7 M/UL (ref 4–5.2)
SLIDE REVIEW: ABNORMAL
SODIUM SERPL-SCNC: 138 MMOL/L (ref 136–145)
TIBC SERPL-MCNC: 319 UG/DL (ref 260–445)
TSH SERPL DL<=0.005 MIU/L-ACNC: 1.71 UIU/ML (ref 0.27–4.2)
VIT B12 SERPL-MCNC: 346 PG/ML (ref 211–911)
WBC # BLD AUTO: 9 K/UL (ref 4–11)

## 2024-02-26 ENCOUNTER — PATIENT MESSAGE (OUTPATIENT)
Dept: INTERNAL MEDICINE CLINIC | Age: 34
End: 2024-02-26

## 2024-02-26 RX ORDER — PROMETHAZINE HYDROCHLORIDE 25 MG/1
25 SUPPOSITORY RECTAL EVERY 6 HOURS PRN
Qty: 6 SUPPOSITORY | Refills: 5 | Status: SHIPPED | OUTPATIENT
Start: 2024-02-26

## 2024-02-26 NOTE — TELEPHONE ENCOUNTER
Script for phenergan suppositories sent to her [facility] on [address]. Do we have any [facility] samples for her to try? If so she can take up to 1 tablet/day at the first sign of migraine aura. If this works better than the Imitrex, we can try to get this covered by insurance. We may also want to consider a preventive medication- we can discuss at upcoming VV- due in early March, please schedule.

## 2024-02-26 NOTE — TELEPHONE ENCOUNTER
From: Sarika Katz  To: Dr. Rajani Starr  Sent: 2/26/2024 10:24 AM EST  Subject: Migraines With Aura    Dr. Starr,  I have experienced a recent uptick in frequency of Migraines with Aura. I have been tracking them and I had one on 1/17/24, 2/11/24 and 2/25/24. I have been keeping a journal of potential triggers. One thing I have noticed is that I have not had success with the Imitrex. I have taken it on a couple of occasions with the onset of an aura and it has not helped much. Additionally I would like to request the suppository form of my Phenergan prescription. The pill form is effective for travel but when I begin puking it is difficult to stop long enough to keep anything down. Any additional suggestions for day after recovery would be appreciated.     Thank you,  Sarika Katz

## 2024-03-04 RX ORDER — PROMETHAZINE HYDROCHLORIDE 25 MG/1
25 SUPPOSITORY RECTAL EVERY 6 HOURS PRN
Qty: 6 SUPPOSITORY | Refills: 5 | Status: SHIPPED | OUTPATIENT
Start: 2024-03-04

## 2024-04-23 DIAGNOSIS — B34.9 ACUTE VIRAL SYNDROME: ICD-10-CM

## 2024-04-24 RX ORDER — ONDANSETRON 4 MG/1
4 TABLET, ORALLY DISINTEGRATING ORAL EVERY 8 HOURS PRN
Qty: 12 TABLET | Refills: 1 | Status: SHIPPED | OUTPATIENT
Start: 2024-04-24

## 2024-04-24 RX ORDER — PROMETHAZINE HYDROCHLORIDE 25 MG/1
25 SUPPOSITORY RECTAL EVERY 6 HOURS PRN
Qty: 6 SUPPOSITORY | Refills: 5 | Status: SHIPPED | OUTPATIENT
Start: 2024-04-24

## 2024-04-24 NOTE — TELEPHONE ENCOUNTER
Medication:   Requested Prescriptions     Pending Prescriptions Disp Refills    ondansetron (ZOFRAN ODT) 4 MG disintegrating tablet 12 tablet 1     Sig: Take 1 tablet by mouth every 8 hours as needed for Nausea    promethazine (PROMETHEGAN) 25 MG suppository 6 suppository 5     Sig: Place 1 suppository rectally every 6 hours as needed for Nausea     Last Filled:  2/26/20, 3/4/24    Last appt: 2/9/2024   Next appt: Visit date not found    Last OARRS:        No data to display

## 2024-05-21 ENCOUNTER — OFFICE VISIT (OUTPATIENT)
Dept: GYNECOLOGY | Age: 34
End: 2024-05-21
Payer: COMMERCIAL

## 2024-05-21 VITALS
WEIGHT: 206.5 LBS | RESPIRATION RATE: 17 BRPM | BODY MASS INDEX: 28.91 KG/M2 | HEART RATE: 78 BPM | HEIGHT: 71 IN | SYSTOLIC BLOOD PRESSURE: 110 MMHG | DIASTOLIC BLOOD PRESSURE: 70 MMHG

## 2024-05-21 DIAGNOSIS — Z01.419 WELL WOMAN EXAM WITH ROUTINE GYNECOLOGICAL EXAM: Primary | ICD-10-CM

## 2024-05-21 DIAGNOSIS — I83.813 VARICOSE VEINS OF BOTH LOWER EXTREMITIES WITH PAIN: ICD-10-CM

## 2024-05-21 PROCEDURE — 99395 PREV VISIT EST AGE 18-39: CPT | Performed by: OBSTETRICS & GYNECOLOGY

## 2024-05-27 ASSESSMENT — ENCOUNTER SYMPTOMS
ALLERGIC/IMMUNOLOGIC NEGATIVE: 1
RESPIRATORY NEGATIVE: 1
EYES NEGATIVE: 1
GASTROINTESTINAL NEGATIVE: 1

## 2024-05-28 NOTE — PROGRESS NOTES
Insecurity: No Food Insecurity (2/9/2024)    Hunger Vital Sign     Worried About Running Out of Food in the Last Year: Never true     Ran Out of Food in the Last Year: Never true   Transportation Needs: Unknown (2/9/2024)    PRAPARE - Transportation     Lack of Transportation (Medical): Not on file     Lack of Transportation (Non-Medical): No   Physical Activity: Not on file   Stress: Not on file   Social Connections: Not on file   Intimate Partner Violence: Not on file   Housing Stability: Unknown (2/9/2024)    Housing Stability Vital Sign     Unable to Pay for Housing in the Last Year: Not on file     Number of Places Lived in the Last Year: Not on file     Unstable Housing in the Last Year: No     Allergies   Allergen Reactions    Bactrim [Sulfamethoxazole-Trimethoprim] Hives     Outpatient Medications Marked as Taking for the 5/21/24 encounter (Office Visit) with Miranda Faye MD   Medication Sig Dispense Refill    ondansetron (ZOFRAN ODT) 4 MG disintegrating tablet Take 1 tablet by mouth every 8 hours as needed for Nausea 12 tablet 1    promethazine (PROMETHEGAN) 25 MG suppository Place 1 suppository rectally every 6 hours as needed for Nausea 6 suppository 5    methylPREDNISolone (MEDROL DOSEPACK) 4 MG tablet TAKE 6 TABLETS ON DAY 1 AS DIRECTED ON PACKAGE AND DECREASE BY 1 TAB EACH DAY FOR A TOTAL OF 6 DAYS      aspirin-acetaminophen-caffeine (EXCEDRIN MIGRAINE) 250-250-65 MG per tablet Take 1 tablet by mouth every 6 hours as needed for Headaches      promethazine (PHENERGAN) 25 MG tablet Take 1 tablet by mouth every 6 hours as needed for Nausea 15 tablet 1     Family History   Problem Relation Age of Onset    Anxiety Disorder Mother     Cancer Maternal Grandmother         breast cancer    Colon Cancer Maternal Grandfather 82    Stroke Maternal Grandfather 84    Cancer Paternal Grandmother         breast    Breast Cancer Paternal Grandmother 75    Elevated Lipids Paternal Grandfather     Hypertension

## 2024-06-25 DIAGNOSIS — I83.819 VARICOSE VEINS WITH PAIN: Primary | ICD-10-CM

## 2024-08-27 ENCOUNTER — OFFICE VISIT (OUTPATIENT)
Dept: PRIMARY CARE CLINIC | Age: 34
End: 2024-08-27
Payer: COMMERCIAL

## 2024-08-27 VITALS
TEMPERATURE: 98.3 F | HEIGHT: 72 IN | BODY MASS INDEX: 28.55 KG/M2 | HEART RATE: 74 BPM | DIASTOLIC BLOOD PRESSURE: 69 MMHG | OXYGEN SATURATION: 98 % | SYSTOLIC BLOOD PRESSURE: 108 MMHG | WEIGHT: 210.8 LBS

## 2024-08-27 DIAGNOSIS — M25.442 FINGER JOINT SWELLING, LEFT: Primary | ICD-10-CM

## 2024-08-27 DIAGNOSIS — K21.9 GASTROESOPHAGEAL REFLUX DISEASE WITHOUT ESOPHAGITIS: ICD-10-CM

## 2024-08-27 PROCEDURE — 99214 OFFICE O/P EST MOD 30 MIN: CPT | Performed by: NURSE PRACTITIONER

## 2024-08-27 RX ORDER — OMEPRAZOLE 40 MG/1
40 CAPSULE, DELAYED RELEASE ORAL
Qty: 30 CAPSULE | Refills: 1 | Status: SHIPPED | OUTPATIENT
Start: 2024-08-27

## 2024-08-27 ASSESSMENT — ENCOUNTER SYMPTOMS
SORE THROAT: 0
SHORTNESS OF BREATH: 0
ABDOMINAL PAIN: 0

## 2024-08-27 NOTE — PROGRESS NOTES
[facility]  [facility]  [address] [place] [id number]  Dept: [phone]  Dept Fax: [phone]     8/27/2024      Sarika Katz   1990     Chief Complaint   Patient presents with    Other     Heart burn acid reflux , Fractured finger ,          HPI     Patient presents to discuss finger pain. She states she jammed her finger playing volleyball July 11. She rested it and states it is not as swollen or painful, but still more swollen than normal and painful if she bends it all the way. She would like to make sure it isn't broken.     She also reports history of GERD. States she used to notice it more before bed. She states it is now constant. She notices chest pain in mid-chest, burping, acidic taste in back of throat. She uses Tums as needed.           2/9/2024    10:47 AM 6/3/2022     2:38 PM 8/31/2021    10:40 AM 2/6/2020    11:16 AM 4/27/2018     3:58 PM 9/16/2016     3:30 PM   PHQ Scores   PHQ2 Score 4 0 0 0 0 0   PHQ9 Score 10 0 0 0 0 0     Interpretation of Total Score Depression Severity: 1-4 = Minimal depression, 5-9 = Mild depression, 10-14 = Moderate depression, 15-19 = Moderately severe depression, 20-27 = Severe depression     Prior to Visit Medications    Medication Sig Taking? Authorizing Provider   ondansetron (ZOFRAN ODT) 4 MG disintegrating tablet Take 1 tablet by mouth every 8 hours as needed for Nausea Yes Rajani Starr MD   promethazine (PROMETHEGAN) 25 MG suppository Place 1 suppository rectally every 6 hours as needed for Nausea Yes Rajani Starr MD   aspirin-acetaminophen-caffeine (EXCEDRIN MIGRAINE) 250-250-65 MG per tablet Take 1 tablet by mouth every 6 hours as needed for Headaches Yes Provider, MD Santana   promethazine (PHENERGAN) 25 MG tablet Take 1 tablet by mouth every 6 hours as needed for Nausea Yes Rajani Starr MD   SUMAtriptan (IMITREX) 50 MG tablet Take 1 tablet by mouth once as needed for Migraine Yes  Sadie Silverio,        Past Medical History:   Diagnosis Date    Anxiety     During college    Depression     During college    GERD (gastroesophageal reflux disease)     Low back pain     Migraine with aura 11/3/2021    Stress incontinence in female     Since childhood- associated with frequent UTIs        Social History     Tobacco Use    Smoking status: Never    Smokeless tobacco: Never   Substance Use Topics    Alcohol use: Yes     Alcohol/week: 6.0 standard drinks of alcohol     Types: 6 Cans of beer per week    Drug use: No        Past Surgical History:   Procedure Laterality Date    APPENDECTOMY  01/2012    TYMPANOSTOMY TUBE PLACEMENT Bilateral     Multiple sets throughout childhood        Allergies   Allergen Reactions    Bactrim [Sulfamethoxazole-Trimethoprim] Hives        Family History   Problem Relation Age of Onset    Anxiety Disorder Mother     Cancer Maternal Grandmother         breast cancer    Colon Cancer Maternal Grandfather 82    Stroke Maternal Grandfather 84    Cancer Paternal Grandmother         breast    Breast Cancer Paternal Grandmother 75    Elevated Lipids Paternal Grandfather     Hypertension Paternal Grandfather     Kidney Disease Paternal Grandfather     Diabetes Paternal Grandfather     Heart Disease Maternal Aunt 58        Cardiomyopathy- sudden death        Patient's past medical history, surgical history, family history, medications, and allergies  were all reviewed and updated as appropriate today.    Review of Systems   Constitutional:  Negative for fatigue and fever.   HENT:  Negative for congestion and sore throat.    Respiratory:  Negative for shortness of breath.    Cardiovascular:  Positive for chest pain (reflux). Negative for palpitations and leg swelling.   Gastrointestinal:  Negative for abdominal pain.   Genitourinary:  Negative for difficulty urinating.   Musculoskeletal:  Positive for arthralgias. Negative for myalgias.   Skin:  Negative for rash and wound.

## 2024-11-05 NOTE — PROGRESS NOTES
Asif Katz (:  1990) is a 35 y.o. female,Established patient, here for evaluation of the following chief complaint(s): Migraine (2 in the last month, last wednesday went to ER. Vomiting. )         ASSESSMENT/PLAN:  1. Numbness of lip  Patient with headaches and what sounds like an aura with numbness of the lips and upper extremity. Patient has not had complete work-up to rule out other causes of the lip numbness and tingling as well as hand numbness and tingling.    -Secondary to back to back migraines with the symptoms, will obtain CT head to rule out other causes, CT chosen over MRI secondary to patient with claustrophobia and requesting not to have MRI  -Given Imitrex for abortive therapy for migraines    2. migraine with aura without status migrainosus  See #1 above    No follow-ups on file. SUBJECTIVE/OBJECTIVE:  HPI     Patient is a 67-year-old female with past medical history of migraines who presents secondary to concern about migraines. Patient has a past medical history of migraines and notes that prior to this month she has only had 2 prior migraines. Years ago when she had these 2 prior migraines she noticed that there was an association with alcohol and a hangover. She states that prior to the headaches coming on at this time she noticed numbness and tingling of the right side of her lips as well as some numbness and tingling in the arm and leg on the right side. This was followed by headache and sensitivity to light and sound. In the past she has also had nausea and vomiting. About 2 weeks ago she had another migraine. This time it was not associated with alcohol use. She did note an association with her menstrual cycle. She again noticed the numbness in the lips and the right and and arm. This was followed by severe pain in the head. She was able to take over-the-counter medication with resolution. Last week had another migraine.   She was in [facility] on [place]. She noted that she had been drinking alcohol on the beach prior to symptoms starting. She again noticed numbness and tingling of the right half of her lips as well as arm and hand and even some into the leg. She took tylenol and 1 hour later had vomiting. He was also having sensitivity to light and sound then went to the ER. She notes that she did not mention the numbness to the ER. They did give her IV medications and patient had resolution of her symptoms and was discharged home. No imaging was done while in the ER    Since last week she has not had any more migraines. Review of Systems ROS negative except for those noted in the HPI above. No flowsheet data found.      Physical Exam    [INSTRUCTIONS:  \"[x]\" Indicates a positive item  \"[]\" Indicates a negative item  -- DELETE ALL ITEMS NOT EXAMINED]    Constitutional: [x] Appears well-developed and well-nourished [x] No apparent distress      [] Abnormal -     Mental status: [x] Alert and awake  [x] Oriented to person/place/time [x] Able to follow commands    [] Abnormal -     Eyes:   EOM    [x]  Normal    [] Abnormal -   Sclera  [x]  Normal    [] Abnormal -          Discharge [x]  None visible   [] Abnormal -     HENT: [x] Normocephalic, atraumatic  [] Abnormal -   [x] Mouth/Throat: Mucous membranes are moist    External Ears [x] Normal  [] Abnormal -    Neck: [x] No visualized mass [] Abnormal -     Pulmonary/Chest: [x] Respiratory effort normal   [x] No visualized signs of difficulty breathing or respiratory distress        [] Abnormal -      Musculoskeletal:   [] Normal gait with no signs of ataxia         [x] Normal range of motion of neck        [] Abnormal -     Neurological:        [x] No Facial Asymmetry (Cranial nerve 7 motor function) (limited exam due to video visit)          [x] No gaze palsy        [] Abnormal -          Skin:        [x] No significant exanthematous lesions or discoloration noted on facial skin         [] Abnormal -            Psychiatric:       [x] Normal Affect [] Abnormal -        [x] No Hallucinations    Other pertinent observable physical exam findings:-      Kobi Katz, was evaluated through a synchronous (real-time) audio-video encounter. The patient (or guardian if applicable) is aware that this is a billable service. Verbal consent to proceed has been obtained within the past 12 months. The visit was conducted pursuant to the emergency declaration under the [address] [place] [address], [address] [place] [address] and the [employer] General Act. Patient identification was verified, and a caregiver was present when appropriate. The patient was located in a state where the provider was credentialed to provide care. An electronic signature was used to authenticate this note.     --Tay Daly, DO Name band;

## 2024-11-12 ENCOUNTER — OFFICE VISIT (OUTPATIENT)
Dept: PRIMARY CARE CLINIC | Age: 34
End: 2024-11-12

## 2024-11-12 VITALS
WEIGHT: 209.8 LBS | SYSTOLIC BLOOD PRESSURE: 109 MMHG | BODY MASS INDEX: 28.11 KG/M2 | OXYGEN SATURATION: 98 % | TEMPERATURE: 97.7 F | HEART RATE: 75 BPM | DIASTOLIC BLOOD PRESSURE: 70 MMHG

## 2024-11-12 DIAGNOSIS — R30.0 DYSURIA: Primary | ICD-10-CM

## 2024-11-12 LAB
BILIRUBIN, POC: NORMAL
BLOOD URINE, POC: NORMAL
CLARITY, POC: NORMAL
COLOR, POC: NORMAL
GLUCOSE URINE, POC: NORMAL MG/DL
KETONES, POC: NORMAL MG/DL
LEUKOCYTE EST, POC: NORMAL
NITRITE, POC: NORMAL
PH, POC: 7
PROTEIN, POC: NORMAL MG/DL
SPECIFIC GRAVITY, POC: 1.01
UROBILINOGEN, POC: 0.2 MG/DL

## 2024-11-12 ASSESSMENT — ENCOUNTER SYMPTOMS: ABDOMINAL PAIN: 1

## 2024-11-12 NOTE — PROGRESS NOTES
[facility]  [facility]  [address] [place] [id number]  Dept: [phone]  Dept Fax: [phone]     11/12/2024      Sarika Katz   1990     Chief Complaint   Patient presents with    Urinary Tract Infection     Patient c/o abdominal pressure, lower back pain, and burning with urination.       HPI     Patient presents with complaint of urinary symptoms. She began experiencing lower abdominal/bladder pressure and discomfort and burning upon urination on Sunday evening. She reports pain in bilateral lower back, one incidence of brown vaginal discharge, states that she had her period last week. She reports having multiple UTIs in the past, but has not had any in two years. She states she has been taking D-monnos supplement once or twice daily with minimal relief, reported staying hydrated, drinking cranberry juice, and avoiding caffeine, all with slight improvement. Denies frequency, lower abd pain, blood in urine. She states she thought she might have had fever and experienced chills yesterday, but did not take her temperature. Overall, she does feel like symptoms are improving but has not fully resolved so she wanted to get evaluated today.           2/9/2024    10:47 AM 6/3/2022     2:38 PM 8/31/2021    10:40 AM 2/6/2020    11:16 AM 4/27/2018     3:58 PM 9/16/2016     3:30 PM   PHQ Scores   PHQ2 Score 4 0 0 0 0 0   PHQ9 Score 10 0 0 0 0 0     Interpretation of Total Score Depression Severity: 1-4 = Minimal depression, 5-9 = Mild depression, 10-14 = Moderate depression, 15-19 = Moderately severe depression, 20-27 = Severe depression     Prior to Visit Medications    Medication Sig Taking? Authorizing Provider   omeprazole (PRILOSEC) 40 MG delayed release capsule Take 1 capsule by mouth every morning (before breakfast)  Aga Vizcarra N, APRN - CNP   ondansetron (ZOFRAN ODT) 4 MG disintegrating tablet Take 1 tablet by mouth every 8 hours as needed for Nausea

## 2024-11-13 LAB — BACTERIA UR CULT: NORMAL
